# Patient Record
Sex: MALE | Race: BLACK OR AFRICAN AMERICAN | NOT HISPANIC OR LATINO | Employment: FULL TIME | URBAN - METROPOLITAN AREA
[De-identification: names, ages, dates, MRNs, and addresses within clinical notes are randomized per-mention and may not be internally consistent; named-entity substitution may affect disease eponyms.]

---

## 2021-01-01 ENCOUNTER — HOSPITAL ENCOUNTER (INPATIENT)
Facility: HOSPITAL | Age: 41
LOS: 10 days | Discharge: HOME | DRG: 885 | End: 2021-01-11
Attending: PSYCHIATRY & NEUROLOGY | Admitting: PSYCHIATRY & NEUROLOGY

## 2021-01-01 DIAGNOSIS — R44.0 AUDITORY HALLUCINATIONS: ICD-10-CM

## 2021-01-01 DIAGNOSIS — F20.89 OTHER SCHIZOPHRENIA: Primary | ICD-10-CM

## 2021-01-01 LAB
CHOLEST SERPL-MCNC: 117 MG/DL (ref 120–199)
CHOLEST/HDLC SERPL: 2.7 {RATIO} (ref 2–5)
ESTIMATED AVG GLUCOSE: 105 MG/DL (ref 68–131)
HBA1C MFR BLD HPLC: 5.3 % (ref 4–5.6)
HDLC SERPL-MCNC: 44 MG/DL (ref 40–75)
HDLC SERPL: 37.6 % (ref 20–50)
HIV1+2 IGG SERPL QL IA.RAPID: NORMAL
LDLC SERPL CALC-MCNC: 60.4 MG/DL (ref 63–159)
NONHDLC SERPL-MCNC: 73 MG/DL
TRIGL SERPL-MCNC: 63 MG/DL (ref 30–150)

## 2021-01-01 PROCEDURE — 99222 1ST HOSP IP/OBS MODERATE 55: CPT | Mod: ,,, | Performed by: INTERNAL MEDICINE

## 2021-01-01 PROCEDURE — 36415 COLL VENOUS BLD VENIPUNCTURE: CPT

## 2021-01-01 PROCEDURE — 99223 1ST HOSP IP/OBS HIGH 75: CPT | Mod: ,,, | Performed by: PSYCHIATRY & NEUROLOGY

## 2021-01-01 PROCEDURE — 80061 LIPID PANEL: CPT

## 2021-01-01 PROCEDURE — 90833 PSYTX W PT W E/M 30 MIN: CPT | Mod: ,,, | Performed by: PSYCHIATRY & NEUROLOGY

## 2021-01-01 PROCEDURE — 90833 PR PSYCHOTHERAPY W/PATIENT W/E&M, 30 MIN (ADD ON): ICD-10-PCS | Mod: ,,, | Performed by: PSYCHIATRY & NEUROLOGY

## 2021-01-01 PROCEDURE — 99223 PR INITIAL HOSPITAL CARE,LEVL III: ICD-10-PCS | Mod: ,,, | Performed by: PSYCHIATRY & NEUROLOGY

## 2021-01-01 PROCEDURE — 25000003 PHARM REV CODE 250: Performed by: PSYCHIATRY & NEUROLOGY

## 2021-01-01 PROCEDURE — 86701 HIV-1ANTIBODY: CPT

## 2021-01-01 PROCEDURE — 99222 PR INITIAL HOSPITAL CARE,LEVL II: ICD-10-PCS | Mod: ,,, | Performed by: INTERNAL MEDICINE

## 2021-01-01 PROCEDURE — 11400000 HC PSYCH PRIVATE ROOM

## 2021-01-01 PROCEDURE — 83036 HEMOGLOBIN GLYCOSYLATED A1C: CPT

## 2021-01-01 RX ORDER — FLUOXETINE 10 MG/1
10 CAPSULE ORAL DAILY
Status: ON HOLD | COMMUNITY
End: 2021-01-01

## 2021-01-01 RX ORDER — MAG HYDROX/ALUMINUM HYD/SIMETH 200-200-20
30 SUSPENSION, ORAL (FINAL DOSE FORM) ORAL EVERY 6 HOURS PRN
Status: DISCONTINUED | OUTPATIENT
Start: 2021-01-01 | End: 2021-01-11 | Stop reason: HOSPADM

## 2021-01-01 RX ORDER — FOLIC ACID 1 MG/1
1 TABLET ORAL DAILY
Status: DISCONTINUED | OUTPATIENT
Start: 2021-01-01 | End: 2021-01-11 | Stop reason: HOSPADM

## 2021-01-01 RX ORDER — OLANZAPINE 10 MG/2ML
10 INJECTION, POWDER, FOR SOLUTION INTRAMUSCULAR EVERY 6 HOURS PRN
Status: DISCONTINUED | OUTPATIENT
Start: 2021-01-01 | End: 2021-01-01

## 2021-01-01 RX ORDER — IBUPROFEN 200 MG
1 TABLET ORAL DAILY
Status: DISCONTINUED | OUTPATIENT
Start: 2021-01-01 | End: 2021-01-11 | Stop reason: HOSPADM

## 2021-01-01 RX ORDER — LOPERAMIDE HYDROCHLORIDE 2 MG/1
2 CAPSULE ORAL
Status: DISCONTINUED | OUTPATIENT
Start: 2021-01-01 | End: 2021-01-11 | Stop reason: HOSPADM

## 2021-01-01 RX ORDER — ACETAMINOPHEN 325 MG/1
650 TABLET ORAL EVERY 6 HOURS PRN
Status: DISCONTINUED | OUTPATIENT
Start: 2021-01-01 | End: 2021-01-11 | Stop reason: HOSPADM

## 2021-01-01 RX ORDER — MUPIROCIN 20 MG/G
OINTMENT TOPICAL 2 TIMES DAILY
Status: DISCONTINUED | OUTPATIENT
Start: 2021-01-01 | End: 2021-01-01

## 2021-01-01 RX ORDER — OLANZAPINE 10 MG/1
10 TABLET ORAL EVERY 6 HOURS PRN
Status: DISCONTINUED | OUTPATIENT
Start: 2021-01-01 | End: 2021-01-01

## 2021-01-01 RX ORDER — HYDROXYZINE PAMOATE 50 MG/1
50 CAPSULE ORAL EVERY 6 HOURS PRN
Status: DISCONTINUED | OUTPATIENT
Start: 2021-01-01 | End: 2021-01-01

## 2021-01-01 RX ORDER — OLANZAPINE 10 MG/1
10 TABLET ORAL EVERY 4 HOURS PRN
Status: DISCONTINUED | OUTPATIENT
Start: 2021-01-01 | End: 2021-01-11 | Stop reason: HOSPADM

## 2021-01-01 RX ORDER — DOCUSATE SODIUM 100 MG/1
100 CAPSULE, LIQUID FILLED ORAL DAILY PRN
Status: DISCONTINUED | OUTPATIENT
Start: 2021-01-01 | End: 2021-01-11 | Stop reason: HOSPADM

## 2021-01-01 RX ORDER — RISPERIDONE 0.5 MG/1
1 TABLET ORAL 2 TIMES DAILY
Status: DISCONTINUED | OUTPATIENT
Start: 2021-01-01 | End: 2021-01-04

## 2021-01-01 RX ORDER — BENZTROPINE MESYLATE 1 MG/1
1 TABLET ORAL 2 TIMES DAILY
Status: ON HOLD | COMMUNITY
End: 2021-01-01

## 2021-01-01 RX ORDER — HYDROXYZINE PAMOATE 50 MG/1
50 CAPSULE ORAL EVERY 6 HOURS PRN
Status: DISCONTINUED | OUTPATIENT
Start: 2021-01-01 | End: 2021-01-11 | Stop reason: HOSPADM

## 2021-01-01 RX ORDER — RISPERIDONE 1 MG/1
1 TABLET ORAL 2 TIMES DAILY
Status: ON HOLD | COMMUNITY
End: 2021-01-11 | Stop reason: HOSPADM

## 2021-01-01 RX ORDER — OLANZAPINE 10 MG/2ML
10 INJECTION, POWDER, FOR SOLUTION INTRAMUSCULAR EVERY 4 HOURS PRN
Status: DISCONTINUED | OUTPATIENT
Start: 2021-01-01 | End: 2021-01-11 | Stop reason: HOSPADM

## 2021-01-01 RX ORDER — SERTRALINE HYDROCHLORIDE 25 MG/1
25 TABLET, FILM COATED ORAL DAILY
Status: DISCONTINUED | OUTPATIENT
Start: 2021-01-01 | End: 2021-01-02

## 2021-01-01 RX ADMIN — THERA TABS 1 TABLET: TAB at 09:01

## 2021-01-01 RX ADMIN — RISPERIDONE 1 MG: 0.5 TABLET ORAL at 08:01

## 2021-01-01 RX ADMIN — SERTRALINE HYDROCHLORIDE 25 MG: 25 TABLET ORAL at 12:01

## 2021-01-01 RX ADMIN — FOLIC ACID 1 MG: 1 TABLET ORAL at 09:01

## 2021-01-02 PROCEDURE — 90833 PR PSYCHOTHERAPY W/PATIENT W/E&M, 30 MIN (ADD ON): ICD-10-PCS | Mod: ,,, | Performed by: PSYCHIATRY & NEUROLOGY

## 2021-01-02 PROCEDURE — 25000003 PHARM REV CODE 250: Performed by: PSYCHIATRY & NEUROLOGY

## 2021-01-02 PROCEDURE — 99233 PR SUBSEQUENT HOSPITAL CARE,LEVL III: ICD-10-PCS | Mod: ,,, | Performed by: PSYCHIATRY & NEUROLOGY

## 2021-01-02 PROCEDURE — 11400000 HC PSYCH PRIVATE ROOM

## 2021-01-02 PROCEDURE — 90833 PSYTX W PT W E/M 30 MIN: CPT | Mod: ,,, | Performed by: PSYCHIATRY & NEUROLOGY

## 2021-01-02 PROCEDURE — 99233 SBSQ HOSP IP/OBS HIGH 50: CPT | Mod: ,,, | Performed by: PSYCHIATRY & NEUROLOGY

## 2021-01-02 RX ORDER — SERTRALINE HYDROCHLORIDE 50 MG/1
50 TABLET, FILM COATED ORAL DAILY
Status: DISCONTINUED | OUTPATIENT
Start: 2021-01-03 | End: 2021-01-04

## 2021-01-02 RX ADMIN — RISPERIDONE 1 MG: 0.5 TABLET ORAL at 08:01

## 2021-01-02 RX ADMIN — FOLIC ACID 1 MG: 1 TABLET ORAL at 08:01

## 2021-01-02 RX ADMIN — THERA TABS 1 TABLET: TAB at 08:01

## 2021-01-02 RX ADMIN — SERTRALINE HYDROCHLORIDE 25 MG: 25 TABLET ORAL at 08:01

## 2021-01-03 PROCEDURE — 99233 PR SUBSEQUENT HOSPITAL CARE,LEVL III: ICD-10-PCS | Mod: ,,, | Performed by: PSYCHIATRY & NEUROLOGY

## 2021-01-03 PROCEDURE — 99233 SBSQ HOSP IP/OBS HIGH 50: CPT | Mod: ,,, | Performed by: PSYCHIATRY & NEUROLOGY

## 2021-01-03 PROCEDURE — 25000003 PHARM REV CODE 250: Performed by: PSYCHIATRY & NEUROLOGY

## 2021-01-03 PROCEDURE — 11400000 HC PSYCH PRIVATE ROOM

## 2021-01-03 PROCEDURE — 90833 PSYTX W PT W E/M 30 MIN: CPT | Mod: ,,, | Performed by: PSYCHIATRY & NEUROLOGY

## 2021-01-03 PROCEDURE — 90833 PR PSYCHOTHERAPY W/PATIENT W/E&M, 30 MIN (ADD ON): ICD-10-PCS | Mod: ,,, | Performed by: PSYCHIATRY & NEUROLOGY

## 2021-01-03 RX ORDER — DIPHENHYDRAMINE HCL 50 MG
50 CAPSULE ORAL NIGHTLY
Status: DISCONTINUED | OUTPATIENT
Start: 2021-01-03 | End: 2021-01-04

## 2021-01-03 RX ADMIN — DIPHENHYDRAMINE HYDROCHLORIDE 50 MG: 50 CAPSULE ORAL at 08:01

## 2021-01-03 RX ADMIN — RISPERIDONE 1 MG: 0.5 TABLET ORAL at 08:01

## 2021-01-03 RX ADMIN — SERTRALINE HYDROCHLORIDE 50 MG: 50 TABLET ORAL at 08:01

## 2021-01-03 RX ADMIN — FOLIC ACID 1 MG: 1 TABLET ORAL at 08:01

## 2021-01-03 RX ADMIN — THERA TABS 1 TABLET: TAB at 08:01

## 2021-01-04 PROCEDURE — 90833 PSYTX W PT W E/M 30 MIN: CPT | Mod: ,,, | Performed by: PSYCHIATRY & NEUROLOGY

## 2021-01-04 PROCEDURE — 99233 SBSQ HOSP IP/OBS HIGH 50: CPT | Mod: ,,, | Performed by: PSYCHIATRY & NEUROLOGY

## 2021-01-04 PROCEDURE — 11400000 HC PSYCH PRIVATE ROOM

## 2021-01-04 PROCEDURE — 90833 PR PSYCHOTHERAPY W/PATIENT W/E&M, 30 MIN (ADD ON): ICD-10-PCS | Mod: ,,, | Performed by: PSYCHIATRY & NEUROLOGY

## 2021-01-04 PROCEDURE — 25000003 PHARM REV CODE 250: Performed by: PSYCHIATRY & NEUROLOGY

## 2021-01-04 PROCEDURE — 99233 PR SUBSEQUENT HOSPITAL CARE,LEVL III: ICD-10-PCS | Mod: ,,, | Performed by: PSYCHIATRY & NEUROLOGY

## 2021-01-04 RX ORDER — RISPERIDONE 2 MG/1
2 TABLET ORAL 2 TIMES DAILY
Status: DISCONTINUED | OUTPATIENT
Start: 2021-01-04 | End: 2021-01-08

## 2021-01-04 RX ORDER — DIPHENHYDRAMINE HCL 50 MG
100 CAPSULE ORAL NIGHTLY
Status: DISCONTINUED | OUTPATIENT
Start: 2021-01-04 | End: 2021-01-11 | Stop reason: HOSPADM

## 2021-01-04 RX ADMIN — DIPHENHYDRAMINE HYDROCHLORIDE 100 MG: 50 CAPSULE ORAL at 08:01

## 2021-01-04 RX ADMIN — RISPERIDONE 1 MG: 0.5 TABLET ORAL at 08:01

## 2021-01-04 RX ADMIN — RISPERIDONE 2 MG: 2 TABLET ORAL at 08:01

## 2021-01-04 RX ADMIN — FOLIC ACID 1 MG: 1 TABLET ORAL at 08:01

## 2021-01-04 RX ADMIN — SERTRALINE HYDROCHLORIDE 50 MG: 50 TABLET ORAL at 08:01

## 2021-01-04 RX ADMIN — THERA TABS 1 TABLET: TAB at 08:01

## 2021-01-05 PROCEDURE — 25000003 PHARM REV CODE 250: Performed by: PSYCHIATRY & NEUROLOGY

## 2021-01-05 PROCEDURE — 99233 PR SUBSEQUENT HOSPITAL CARE,LEVL III: ICD-10-PCS | Mod: ,,, | Performed by: STUDENT IN AN ORGANIZED HEALTH CARE EDUCATION/TRAINING PROGRAM

## 2021-01-05 PROCEDURE — 11400000 HC PSYCH PRIVATE ROOM

## 2021-01-05 PROCEDURE — 99233 SBSQ HOSP IP/OBS HIGH 50: CPT | Mod: ,,, | Performed by: STUDENT IN AN ORGANIZED HEALTH CARE EDUCATION/TRAINING PROGRAM

## 2021-01-05 RX ADMIN — FOLIC ACID 1 MG: 1 TABLET ORAL at 08:01

## 2021-01-05 RX ADMIN — SERTRALINE HYDROCHLORIDE 75 MG: 50 TABLET ORAL at 08:01

## 2021-01-05 RX ADMIN — RISPERIDONE 2 MG: 2 TABLET ORAL at 08:01

## 2021-01-05 RX ADMIN — THERA TABS 1 TABLET: TAB at 08:01

## 2021-01-05 RX ADMIN — DIPHENHYDRAMINE HYDROCHLORIDE 100 MG: 50 CAPSULE ORAL at 08:01

## 2021-01-06 PROCEDURE — 25000003 PHARM REV CODE 250: Performed by: PSYCHIATRY & NEUROLOGY

## 2021-01-06 PROCEDURE — 11400000 HC PSYCH PRIVATE ROOM

## 2021-01-06 PROCEDURE — 99233 PR SUBSEQUENT HOSPITAL CARE,LEVL III: ICD-10-PCS | Mod: ,,, | Performed by: STUDENT IN AN ORGANIZED HEALTH CARE EDUCATION/TRAINING PROGRAM

## 2021-01-06 PROCEDURE — 99233 SBSQ HOSP IP/OBS HIGH 50: CPT | Mod: ,,, | Performed by: STUDENT IN AN ORGANIZED HEALTH CARE EDUCATION/TRAINING PROGRAM

## 2021-01-06 RX ORDER — SERTRALINE HYDROCHLORIDE 100 MG/1
100 TABLET, FILM COATED ORAL DAILY
Status: DISCONTINUED | OUTPATIENT
Start: 2021-01-07 | End: 2021-01-11 | Stop reason: HOSPADM

## 2021-01-06 RX ADMIN — SERTRALINE HYDROCHLORIDE 75 MG: 50 TABLET ORAL at 08:01

## 2021-01-06 RX ADMIN — RISPERIDONE 2 MG: 2 TABLET ORAL at 08:01

## 2021-01-06 RX ADMIN — THERA TABS 1 TABLET: TAB at 08:01

## 2021-01-06 RX ADMIN — DIPHENHYDRAMINE HYDROCHLORIDE 100 MG: 50 CAPSULE ORAL at 08:01

## 2021-01-06 RX ADMIN — FOLIC ACID 1 MG: 1 TABLET ORAL at 08:01

## 2021-01-07 PROCEDURE — 99233 PR SUBSEQUENT HOSPITAL CARE,LEVL III: ICD-10-PCS | Mod: ,,, | Performed by: STUDENT IN AN ORGANIZED HEALTH CARE EDUCATION/TRAINING PROGRAM

## 2021-01-07 PROCEDURE — 11400000 HC PSYCH PRIVATE ROOM

## 2021-01-07 PROCEDURE — 25000003 PHARM REV CODE 250: Performed by: STUDENT IN AN ORGANIZED HEALTH CARE EDUCATION/TRAINING PROGRAM

## 2021-01-07 PROCEDURE — 99233 SBSQ HOSP IP/OBS HIGH 50: CPT | Mod: ,,, | Performed by: STUDENT IN AN ORGANIZED HEALTH CARE EDUCATION/TRAINING PROGRAM

## 2021-01-07 PROCEDURE — 25000003 PHARM REV CODE 250: Performed by: PSYCHIATRY & NEUROLOGY

## 2021-01-07 RX ADMIN — THERA TABS 1 TABLET: TAB at 08:01

## 2021-01-07 RX ADMIN — FOLIC ACID 1 MG: 1 TABLET ORAL at 08:01

## 2021-01-07 RX ADMIN — RISPERIDONE 2 MG: 2 TABLET ORAL at 08:01

## 2021-01-07 RX ADMIN — DIPHENHYDRAMINE HYDROCHLORIDE 100 MG: 50 CAPSULE ORAL at 08:01

## 2021-01-07 RX ADMIN — SERTRALINE HYDROCHLORIDE 100 MG: 100 TABLET ORAL at 08:01

## 2021-01-08 PROCEDURE — 25000003 PHARM REV CODE 250: Performed by: PSYCHIATRY & NEUROLOGY

## 2021-01-08 PROCEDURE — 99233 SBSQ HOSP IP/OBS HIGH 50: CPT | Mod: ,,, | Performed by: STUDENT IN AN ORGANIZED HEALTH CARE EDUCATION/TRAINING PROGRAM

## 2021-01-08 PROCEDURE — 11400000 HC PSYCH PRIVATE ROOM

## 2021-01-08 PROCEDURE — 25000003 PHARM REV CODE 250: Performed by: STUDENT IN AN ORGANIZED HEALTH CARE EDUCATION/TRAINING PROGRAM

## 2021-01-08 PROCEDURE — 99233 PR SUBSEQUENT HOSPITAL CARE,LEVL III: ICD-10-PCS | Mod: ,,, | Performed by: STUDENT IN AN ORGANIZED HEALTH CARE EDUCATION/TRAINING PROGRAM

## 2021-01-08 RX ORDER — RISPERIDONE 3 MG/1
3 TABLET ORAL 2 TIMES DAILY
Status: DISCONTINUED | OUTPATIENT
Start: 2021-01-08 | End: 2021-01-11 | Stop reason: HOSPADM

## 2021-01-08 RX ADMIN — RISPERIDONE 2 MG: 2 TABLET ORAL at 08:01

## 2021-01-08 RX ADMIN — SERTRALINE HYDROCHLORIDE 100 MG: 100 TABLET ORAL at 08:01

## 2021-01-08 RX ADMIN — RISPERIDONE 3 MG: 3 TABLET ORAL at 08:01

## 2021-01-08 RX ADMIN — THERA TABS 1 TABLET: TAB at 08:01

## 2021-01-08 RX ADMIN — FOLIC ACID 1 MG: 1 TABLET ORAL at 08:01

## 2021-01-08 RX ADMIN — DIPHENHYDRAMINE HYDROCHLORIDE 100 MG: 50 CAPSULE ORAL at 08:01

## 2021-01-09 PROCEDURE — 99233 SBSQ HOSP IP/OBS HIGH 50: CPT | Mod: ,,, | Performed by: STUDENT IN AN ORGANIZED HEALTH CARE EDUCATION/TRAINING PROGRAM

## 2021-01-09 PROCEDURE — 25000003 PHARM REV CODE 250: Performed by: PSYCHIATRY & NEUROLOGY

## 2021-01-09 PROCEDURE — 11400000 HC PSYCH PRIVATE ROOM

## 2021-01-09 PROCEDURE — 25000003 PHARM REV CODE 250: Performed by: STUDENT IN AN ORGANIZED HEALTH CARE EDUCATION/TRAINING PROGRAM

## 2021-01-09 PROCEDURE — 99233 PR SUBSEQUENT HOSPITAL CARE,LEVL III: ICD-10-PCS | Mod: ,,, | Performed by: STUDENT IN AN ORGANIZED HEALTH CARE EDUCATION/TRAINING PROGRAM

## 2021-01-09 RX ORDER — MIRTAZAPINE 15 MG/1
15 TABLET, ORALLY DISINTEGRATING ORAL NIGHTLY
Status: DISCONTINUED | OUTPATIENT
Start: 2021-01-09 | End: 2021-01-11 | Stop reason: HOSPADM

## 2021-01-09 RX ADMIN — RISPERIDONE 3 MG: 3 TABLET ORAL at 08:01

## 2021-01-09 RX ADMIN — FOLIC ACID 1 MG: 1 TABLET ORAL at 08:01

## 2021-01-09 RX ADMIN — THERA TABS 1 TABLET: TAB at 08:01

## 2021-01-09 RX ADMIN — SERTRALINE HYDROCHLORIDE 100 MG: 100 TABLET ORAL at 08:01

## 2021-01-09 RX ADMIN — MIRTAZAPINE 15 MG: 15 TABLET, ORALLY DISINTEGRATING ORAL at 08:01

## 2021-01-09 RX ADMIN — DIPHENHYDRAMINE HYDROCHLORIDE 100 MG: 50 CAPSULE ORAL at 08:01

## 2021-01-10 PROCEDURE — 11400000 HC PSYCH PRIVATE ROOM

## 2021-01-10 PROCEDURE — 90833 PSYTX W PT W E/M 30 MIN: CPT | Mod: ,,, | Performed by: STUDENT IN AN ORGANIZED HEALTH CARE EDUCATION/TRAINING PROGRAM

## 2021-01-10 PROCEDURE — 25000003 PHARM REV CODE 250: Performed by: STUDENT IN AN ORGANIZED HEALTH CARE EDUCATION/TRAINING PROGRAM

## 2021-01-10 PROCEDURE — 99233 PR SUBSEQUENT HOSPITAL CARE,LEVL III: ICD-10-PCS | Mod: ,,, | Performed by: STUDENT IN AN ORGANIZED HEALTH CARE EDUCATION/TRAINING PROGRAM

## 2021-01-10 PROCEDURE — 90833 PR PSYCHOTHERAPY W/PATIENT W/E&M, 30 MIN (ADD ON): ICD-10-PCS | Mod: ,,, | Performed by: STUDENT IN AN ORGANIZED HEALTH CARE EDUCATION/TRAINING PROGRAM

## 2021-01-10 PROCEDURE — 99233 SBSQ HOSP IP/OBS HIGH 50: CPT | Mod: ,,, | Performed by: STUDENT IN AN ORGANIZED HEALTH CARE EDUCATION/TRAINING PROGRAM

## 2021-01-10 PROCEDURE — 25000003 PHARM REV CODE 250: Performed by: PSYCHIATRY & NEUROLOGY

## 2021-01-10 RX ADMIN — FOLIC ACID 1 MG: 1 TABLET ORAL at 08:01

## 2021-01-10 RX ADMIN — RISPERIDONE 3 MG: 3 TABLET ORAL at 08:01

## 2021-01-10 RX ADMIN — DIPHENHYDRAMINE HYDROCHLORIDE 100 MG: 50 CAPSULE ORAL at 08:01

## 2021-01-10 RX ADMIN — SERTRALINE HYDROCHLORIDE 100 MG: 100 TABLET ORAL at 08:01

## 2021-01-10 RX ADMIN — THERA TABS 1 TABLET: TAB at 08:01

## 2021-01-10 RX ADMIN — MIRTAZAPINE 15 MG: 15 TABLET, ORALLY DISINTEGRATING ORAL at 08:01

## 2021-01-11 VITALS
DIASTOLIC BLOOD PRESSURE: 55 MMHG | BODY MASS INDEX: 19.92 KG/M2 | HEIGHT: 72 IN | WEIGHT: 147.06 LBS | HEART RATE: 77 BPM | SYSTOLIC BLOOD PRESSURE: 102 MMHG | TEMPERATURE: 99 F | OXYGEN SATURATION: 98 % | RESPIRATION RATE: 18 BRPM

## 2021-01-11 PROBLEM — R44.0 AUDITORY HALLUCINATIONS: Status: RESOLVED | Noted: 2021-01-01 | Resolved: 2021-01-11

## 2021-01-11 PROCEDURE — S4991 NICOTINE PATCH NONLEGEND: HCPCS | Performed by: PSYCHIATRY & NEUROLOGY

## 2021-01-11 PROCEDURE — 99239 HOSP IP/OBS DSCHRG MGMT >30: CPT | Mod: ,,, | Performed by: PSYCHIATRY & NEUROLOGY

## 2021-01-11 PROCEDURE — 25000003 PHARM REV CODE 250: Performed by: STUDENT IN AN ORGANIZED HEALTH CARE EDUCATION/TRAINING PROGRAM

## 2021-01-11 PROCEDURE — 90833 PSYTX W PT W E/M 30 MIN: CPT | Mod: ,,, | Performed by: PSYCHIATRY & NEUROLOGY

## 2021-01-11 PROCEDURE — 90833 PR PSYCHOTHERAPY W/PATIENT W/E&M, 30 MIN (ADD ON): ICD-10-PCS | Mod: ,,, | Performed by: PSYCHIATRY & NEUROLOGY

## 2021-01-11 PROCEDURE — 25000003 PHARM REV CODE 250: Performed by: PSYCHIATRY & NEUROLOGY

## 2021-01-11 PROCEDURE — 99239 PR HOSPITAL DISCHARGE DAY,>30 MIN: ICD-10-PCS | Mod: ,,, | Performed by: PSYCHIATRY & NEUROLOGY

## 2021-01-11 RX ORDER — MIRTAZAPINE 15 MG/1
15 TABLET, FILM COATED ORAL NIGHTLY
Qty: 30 TABLET | Refills: 1 | Status: SHIPPED | OUTPATIENT
Start: 2021-01-11 | End: 2022-04-05

## 2021-01-11 RX ORDER — IBUPROFEN 200 MG
1 TABLET ORAL DAILY
COMMUNITY
Start: 2021-01-11 | End: 2022-03-29

## 2021-01-11 RX ORDER — RISPERIDONE 3 MG/1
3 TABLET ORAL 2 TIMES DAILY
Qty: 60 TABLET | Refills: 1 | Status: SHIPPED | OUTPATIENT
Start: 2021-01-11 | End: 2022-04-05

## 2021-01-11 RX ORDER — DIPHENHYDRAMINE HCL 50 MG
100 CAPSULE ORAL NIGHTLY
Qty: 60 CAPSULE | Refills: 1 | Status: ON HOLD | OUTPATIENT
Start: 2021-01-11 | End: 2022-04-05

## 2021-01-11 RX ORDER — SERTRALINE HYDROCHLORIDE 100 MG/1
100 TABLET, FILM COATED ORAL DAILY
Qty: 30 TABLET | Refills: 1 | Status: SHIPPED | OUTPATIENT
Start: 2021-01-12 | End: 2022-04-05

## 2021-01-11 RX ADMIN — RISPERIDONE 3 MG: 3 TABLET ORAL at 08:01

## 2021-01-11 RX ADMIN — NICOTINE 1 PATCH: 14 PATCH, EXTENDED RELEASE TRANSDERMAL at 08:01

## 2021-01-11 RX ADMIN — FOLIC ACID 1 MG: 1 TABLET ORAL at 08:01

## 2021-01-11 RX ADMIN — THERA TABS 1 TABLET: TAB at 08:01

## 2021-01-11 RX ADMIN — SERTRALINE HYDROCHLORIDE 100 MG: 100 TABLET ORAL at 08:01

## 2021-08-19 ENCOUNTER — TELEPHONE (OUTPATIENT)
Dept: PSYCHIATRY | Facility: CLINIC | Age: 41
End: 2021-08-19

## 2021-08-27 ENCOUNTER — TELEPHONE (OUTPATIENT)
Dept: PAIN MEDICINE | Facility: CLINIC | Age: 41
End: 2021-08-27

## 2022-03-29 ENCOUNTER — HOSPITAL ENCOUNTER (EMERGENCY)
Facility: HOSPITAL | Age: 42
Discharge: PSYCHIATRIC OR SUBSTANCE USE FACILITY | End: 2022-03-29
Attending: STUDENT IN AN ORGANIZED HEALTH CARE EDUCATION/TRAINING PROGRAM

## 2022-03-29 ENCOUNTER — HOSPITAL ENCOUNTER (INPATIENT)
Facility: HOSPITAL | Age: 42
LOS: 7 days | Discharge: HOME | DRG: 885 | End: 2022-04-05
Attending: STUDENT IN AN ORGANIZED HEALTH CARE EDUCATION/TRAINING PROGRAM | Admitting: STUDENT IN AN ORGANIZED HEALTH CARE EDUCATION/TRAINING PROGRAM

## 2022-03-29 VITALS
TEMPERATURE: 97 F | OXYGEN SATURATION: 98 % | RESPIRATION RATE: 18 BRPM | HEART RATE: 79 BPM | WEIGHT: 171.63 LBS | DIASTOLIC BLOOD PRESSURE: 80 MMHG | BODY MASS INDEX: 23.28 KG/M2 | SYSTOLIC BLOOD PRESSURE: 120 MMHG

## 2022-03-29 DIAGNOSIS — R44.0 AUDITORY HALLUCINATION: Primary | ICD-10-CM

## 2022-03-29 DIAGNOSIS — F29 PSYCHOSIS: ICD-10-CM

## 2022-03-29 DIAGNOSIS — F20.89 OTHER SCHIZOPHRENIA: Primary | ICD-10-CM

## 2022-03-29 LAB
ALBUMIN SERPL BCP-MCNC: 4.1 G/DL (ref 3.5–5.2)
ALP SERPL-CCNC: 67 U/L (ref 55–135)
ALT SERPL W/O P-5'-P-CCNC: 64 U/L (ref 10–44)
AMPHET+METHAMPHET UR QL: NEGATIVE
ANION GAP SERPL CALC-SCNC: 11 MMOL/L (ref 8–16)
APAP SERPL-MCNC: <3 UG/ML (ref 10–20)
AST SERPL-CCNC: 109 U/L (ref 10–40)
BARBITURATES UR QL SCN>200 NG/ML: NEGATIVE
BASOPHILS # BLD AUTO: 0.07 K/UL (ref 0–0.2)
BASOPHILS NFR BLD: 0.9 % (ref 0–1.9)
BENZODIAZ UR QL SCN>200 NG/ML: NEGATIVE
BILIRUB SERPL-MCNC: 0.5 MG/DL (ref 0.1–1)
BILIRUB UR QL STRIP: NEGATIVE
BUN SERPL-MCNC: 14 MG/DL (ref 6–20)
BZE UR QL SCN: NEGATIVE
CALCIUM SERPL-MCNC: 9.9 MG/DL (ref 8.7–10.5)
CANNABINOIDS UR QL SCN: NEGATIVE
CHLORIDE SERPL-SCNC: 108 MMOL/L (ref 95–110)
CLARITY UR: CLEAR
CO2 SERPL-SCNC: 25 MMOL/L (ref 23–29)
COLOR UR: YELLOW
CREAT SERPL-MCNC: 1.1 MG/DL (ref 0.5–1.4)
CREAT UR-MCNC: 207.1 MG/DL (ref 23–375)
DIFFERENTIAL METHOD: ABNORMAL
EOSINOPHIL # BLD AUTO: 0.5 K/UL (ref 0–0.5)
EOSINOPHIL NFR BLD: 6.4 % (ref 0–8)
ERYTHROCYTE [DISTWIDTH] IN BLOOD BY AUTOMATED COUNT: 13.3 % (ref 11.5–14.5)
EST. GFR  (AFRICAN AMERICAN): >60 ML/MIN/1.73 M^2
EST. GFR  (NON AFRICAN AMERICAN): >60 ML/MIN/1.73 M^2
ETHANOL SERPL-MCNC: 71 MG/DL
GLUCOSE SERPL-MCNC: 114 MG/DL (ref 70–110)
GLUCOSE UR QL STRIP: NEGATIVE
HCT VFR BLD AUTO: 42.7 % (ref 40–54)
HGB BLD-MCNC: 14.2 G/DL (ref 14–18)
HGB UR QL STRIP: NEGATIVE
IMM GRANULOCYTES # BLD AUTO: 0.02 K/UL (ref 0–0.04)
IMM GRANULOCYTES NFR BLD AUTO: 0.3 % (ref 0–0.5)
KETONES UR QL STRIP: NEGATIVE
LEUKOCYTE ESTERASE UR QL STRIP: NEGATIVE
LYMPHOCYTES # BLD AUTO: 2.6 K/UL (ref 1–4.8)
LYMPHOCYTES NFR BLD: 33 % (ref 18–48)
MCH RBC QN AUTO: 31.9 PG (ref 27–31)
MCHC RBC AUTO-ENTMCNC: 33.3 G/DL (ref 32–36)
MCV RBC AUTO: 96 FL (ref 82–98)
METHADONE UR QL SCN>300 NG/ML: NEGATIVE
MONOCYTES # BLD AUTO: 0.5 K/UL (ref 0.3–1)
MONOCYTES NFR BLD: 6.7 % (ref 4–15)
NEUTROPHILS # BLD AUTO: 4.2 K/UL (ref 1.8–7.7)
NEUTROPHILS NFR BLD: 52.7 % (ref 38–73)
NITRITE UR QL STRIP: NEGATIVE
NRBC BLD-RTO: 0 /100 WBC
OPIATES UR QL SCN: NEGATIVE
PCP UR QL SCN>25 NG/ML: NEGATIVE
PH UR STRIP: 7 [PH] (ref 5–8)
PLATELET # BLD AUTO: 309 K/UL (ref 150–450)
PMV BLD AUTO: 9.9 FL (ref 9.2–12.9)
POTASSIUM SERPL-SCNC: 3.5 MMOL/L (ref 3.5–5.1)
PROT SERPL-MCNC: 7.6 G/DL (ref 6–8.4)
PROT UR QL STRIP: NEGATIVE
RBC # BLD AUTO: 4.45 M/UL (ref 4.6–6.2)
SARS-COV-2 RDRP RESP QL NAA+PROBE: NEGATIVE
SODIUM SERPL-SCNC: 144 MMOL/L (ref 136–145)
SP GR UR STRIP: 1.02 (ref 1–1.03)
TOXICOLOGY INFORMATION: NORMAL
TSH SERPL DL<=0.005 MIU/L-ACNC: 1.18 UIU/ML (ref 0.4–4)
URN SPEC COLLECT METH UR: NORMAL
UROBILINOGEN UR STRIP-ACNC: 1 EU/DL
WBC # BLD AUTO: 7.96 K/UL (ref 3.9–12.7)

## 2022-03-29 PROCEDURE — 11400000 HC PSYCH PRIVATE ROOM

## 2022-03-29 PROCEDURE — 25000003 PHARM REV CODE 250: Performed by: STUDENT IN AN ORGANIZED HEALTH CARE EDUCATION/TRAINING PROGRAM

## 2022-03-29 PROCEDURE — 84443 ASSAY THYROID STIM HORMONE: CPT | Performed by: STUDENT IN AN ORGANIZED HEALTH CARE EDUCATION/TRAINING PROGRAM

## 2022-03-29 PROCEDURE — 80061 LIPID PANEL: CPT | Performed by: STUDENT IN AN ORGANIZED HEALTH CARE EDUCATION/TRAINING PROGRAM

## 2022-03-29 PROCEDURE — 85025 COMPLETE CBC W/AUTO DIFF WBC: CPT | Performed by: STUDENT IN AN ORGANIZED HEALTH CARE EDUCATION/TRAINING PROGRAM

## 2022-03-29 PROCEDURE — 82077 ASSAY SPEC XCP UR&BREATH IA: CPT | Performed by: STUDENT IN AN ORGANIZED HEALTH CARE EDUCATION/TRAINING PROGRAM

## 2022-03-29 PROCEDURE — 36415 COLL VENOUS BLD VENIPUNCTURE: CPT | Performed by: STUDENT IN AN ORGANIZED HEALTH CARE EDUCATION/TRAINING PROGRAM

## 2022-03-29 PROCEDURE — 83036 HEMOGLOBIN GLYCOSYLATED A1C: CPT | Performed by: STUDENT IN AN ORGANIZED HEALTH CARE EDUCATION/TRAINING PROGRAM

## 2022-03-29 PROCEDURE — 80053 COMPREHEN METABOLIC PANEL: CPT | Performed by: STUDENT IN AN ORGANIZED HEALTH CARE EDUCATION/TRAINING PROGRAM

## 2022-03-29 PROCEDURE — 80143 DRUG ASSAY ACETAMINOPHEN: CPT | Performed by: STUDENT IN AN ORGANIZED HEALTH CARE EDUCATION/TRAINING PROGRAM

## 2022-03-29 PROCEDURE — 80307 DRUG TEST PRSMV CHEM ANLYZR: CPT | Performed by: STUDENT IN AN ORGANIZED HEALTH CARE EDUCATION/TRAINING PROGRAM

## 2022-03-29 PROCEDURE — 81003 URINALYSIS AUTO W/O SCOPE: CPT | Mod: 59 | Performed by: STUDENT IN AN ORGANIZED HEALTH CARE EDUCATION/TRAINING PROGRAM

## 2022-03-29 PROCEDURE — U0002 COVID-19 LAB TEST NON-CDC: HCPCS | Performed by: STUDENT IN AN ORGANIZED HEALTH CARE EDUCATION/TRAINING PROGRAM

## 2022-03-29 PROCEDURE — 99285 EMERGENCY DEPT VISIT HI MDM: CPT

## 2022-03-29 RX ORDER — HYDROXYZINE HYDROCHLORIDE 25 MG/1
50 TABLET, FILM COATED ORAL NIGHTLY PRN
Status: DISCONTINUED | OUTPATIENT
Start: 2022-03-29 | End: 2022-03-30

## 2022-03-29 RX ORDER — OLANZAPINE 10 MG/2ML
10 INJECTION, POWDER, FOR SOLUTION INTRAMUSCULAR EVERY 4 HOURS PRN
Status: DISCONTINUED | OUTPATIENT
Start: 2022-03-29 | End: 2022-04-05 | Stop reason: HOSPADM

## 2022-03-29 RX ORDER — IBUPROFEN 400 MG/1
400 TABLET ORAL EVERY 6 HOURS PRN
Status: DISCONTINUED | OUTPATIENT
Start: 2022-03-29 | End: 2022-04-05 | Stop reason: HOSPADM

## 2022-03-29 RX ORDER — ADHESIVE BANDAGE
30 BANDAGE TOPICAL DAILY PRN
Status: DISCONTINUED | OUTPATIENT
Start: 2022-03-29 | End: 2022-04-05 | Stop reason: HOSPADM

## 2022-03-29 RX ORDER — THIAMINE HCL 100 MG
100 TABLET ORAL DAILY
Status: DISCONTINUED | OUTPATIENT
Start: 2022-03-30 | End: 2022-04-05 | Stop reason: HOSPADM

## 2022-03-29 RX ORDER — FOLIC ACID 1 MG/1
1 TABLET ORAL DAILY
Status: DISCONTINUED | OUTPATIENT
Start: 2022-03-30 | End: 2022-04-05 | Stop reason: HOSPADM

## 2022-03-29 RX ORDER — IBUPROFEN 200 MG
1 TABLET ORAL DAILY PRN
Status: DISCONTINUED | OUTPATIENT
Start: 2022-03-29 | End: 2022-03-29

## 2022-03-29 RX ORDER — OLANZAPINE 10 MG/1
10 TABLET ORAL EVERY 4 HOURS PRN
Status: DISCONTINUED | OUTPATIENT
Start: 2022-03-29 | End: 2022-04-05 | Stop reason: HOSPADM

## 2022-03-29 RX ORDER — ACETAMINOPHEN 325 MG/1
650 TABLET ORAL EVERY 6 HOURS PRN
Status: DISCONTINUED | OUTPATIENT
Start: 2022-03-29 | End: 2022-04-05 | Stop reason: HOSPADM

## 2022-03-29 RX ORDER — MAG HYDROX/ALUMINUM HYD/SIMETH 200-200-20
30 SUSPENSION, ORAL (FINAL DOSE FORM) ORAL
Status: DISCONTINUED | OUTPATIENT
Start: 2022-03-29 | End: 2022-04-05 | Stop reason: HOSPADM

## 2022-03-29 RX ORDER — IBUPROFEN 200 MG
1 TABLET ORAL DAILY
Status: DISCONTINUED | OUTPATIENT
Start: 2022-03-30 | End: 2022-04-05 | Stop reason: HOSPADM

## 2022-03-29 RX ORDER — ESOMEPRAZOLE MAGNESIUM 40 MG/1
40 CAPSULE, DELAYED RELEASE ORAL
COMMUNITY
End: 2022-04-05

## 2022-03-29 RX ADMIN — OLANZAPINE 10 MG: 10 TABLET, FILM COATED ORAL at 10:03

## 2022-03-29 RX ADMIN — HYDROXYZINE HYDROCHLORIDE 50 MG: 25 TABLET, FILM COATED ORAL at 08:03

## 2022-03-29 NOTE — ED TRIAGE NOTES
"Patient to ED with c/o hearing voices. Patient reports "my mind is restless." Patient denies HI or SI.  "

## 2022-03-29 NOTE — ED PROVIDER NOTES
"Encounter Date: 3/29/2022       History     Chief Complaint   Patient presents with    Psychiatric Evaluation     41-year-old male with history of anxiety presenting with 2-3 days of altered mood and auditory hallucinations.  He is here with his brother he states that patient was hanging out with a female friend of his, and he notice that when he returned from work (his brother works as a , and is ofte gone for weeks at a time) he notice that his brother was acting differently.  Patient denies any suicidal or homicidal ideations.  States that he feels as though he "can't live in the well today".  Denies visual hallucinations.        Review of patient's allergies indicates:  No Known Allergies  Past Medical History:   Diagnosis Date    Anxiety     Depression      History reviewed. No pertinent surgical history.  History reviewed. No pertinent family history.  Social History     Tobacco Use    Smoking status: Current Every Day Smoker     Packs/day: 1.00     Years: 21.00     Pack years: 21.00     Types: Cigarettes    Smokeless tobacco: Never Used   Substance Use Topics    Alcohol use: Yes    Drug use: Never     Review of Systems   Constitutional: Negative for fever.   HENT: Negative for sore throat.    Respiratory: Negative for shortness of breath.    Cardiovascular: Negative for chest pain.   Gastrointestinal: Negative for nausea.   Genitourinary: Negative for dysuria.   Musculoskeletal: Negative for back pain.   Skin: Negative for rash.   Neurological: Negative for weakness.   Hematological: Does not bruise/bleed easily.   Psychiatric/Behavioral: Positive for hallucinations.       Physical Exam     Initial Vitals [03/29/22 1319]   BP Pulse Resp Temp SpO2   (!) 144/92 88 20 97.8 °F (36.6 °C) 100 %      MAP       --         Physical Exam    Nursing note and vitals reviewed.  Constitutional: He appears well-developed.   Eyes: EOM are normal.   Neck:   Normal range of motion.  Cardiovascular:   No murmur " heard.  Pulmonary/Chest: No respiratory distress.   Abdominal: He exhibits no distension.   Musculoskeletal:         General: Normal range of motion.      Cervical back: Normal range of motion.     Neurological: He is alert.   Skin: Skin is warm.   Psychiatric:   Positive auditory hallucinations.  No visual hallucinations.  No suicidal or homicidal ideations.         ED Course   Procedures  Labs Reviewed   CBC W/ AUTO DIFFERENTIAL - Abnormal; Notable for the following components:       Result Value    RBC 4.45 (*)     MCH 31.9 (*)     All other components within normal limits   COMPREHENSIVE METABOLIC PANEL - Abnormal; Notable for the following components:    Glucose 114 (*)      (*)     ALT 64 (*)     All other components within normal limits   ALCOHOL,MEDICAL (ETHANOL) - Abnormal; Notable for the following components:    Alcohol, Serum 71 (*)     All other components within normal limits   ACETAMINOPHEN LEVEL - Abnormal; Notable for the following components:    Acetaminophen (Tylenol), Serum <3.0 (*)     All other components within normal limits   TSH   SARS-COV-2 RNA AMPLIFICATION, QUAL   URINALYSIS, REFLEX TO URINE CULTURE   DRUG SCREEN PANEL, URINE EMERGENCY          Imaging Results    None          Medications - No data to display  Medical Decision Making:   Differential Diagnosis:   DDX:  Auditory hallucinations.  Low suspicion for suicidal or homicidal ideation.  DX:  Psych labs  TX:  None  Dispo:  Admission to psych floor.                      Clinical Impression:   Final diagnoses:  [R44.0] Auditory hallucination (Primary)          ED Disposition Condition    Transfer to Psych Facility         ED Prescriptions     None        Follow-up Information    None          Efrain Narvaez MD  03/29/22 2985       Efrain Narvaez MD  03/29/22 [id number]

## 2022-03-30 PROBLEM — R79.89 ELEVATED LFTS: Status: ACTIVE | Noted: 2022-03-30

## 2022-03-30 LAB
CHOLEST SERPL-MCNC: 141 MG/DL (ref 120–199)
CHOLEST/HDLC SERPL: 3.4 {RATIO} (ref 2–5)
ESTIMATED AVG GLUCOSE: 108 MG/DL (ref 68–131)
HBA1C MFR BLD: 5.4 % (ref 4–5.6)
HDLC SERPL-MCNC: 41 MG/DL (ref 40–75)
HDLC SERPL: 29.1 % (ref 20–50)
LDLC SERPL CALC-MCNC: 71.4 MG/DL (ref 63–159)
NONHDLC SERPL-MCNC: 100 MG/DL
TRIGL SERPL-MCNC: 143 MG/DL (ref 30–150)

## 2022-03-30 PROCEDURE — 99252 IP/OBS CONSLTJ NEW/EST SF 35: CPT | Mod: ,,, | Performed by: NURSE PRACTITIONER

## 2022-03-30 PROCEDURE — 90833 PR PSYCHOTHERAPY W/PATIENT W/E&M, 30 MIN (ADD ON): ICD-10-PCS | Mod: ,,, | Performed by: STUDENT IN AN ORGANIZED HEALTH CARE EDUCATION/TRAINING PROGRAM

## 2022-03-30 PROCEDURE — 90833 PSYTX W PT W E/M 30 MIN: CPT | Mod: ,,, | Performed by: STUDENT IN AN ORGANIZED HEALTH CARE EDUCATION/TRAINING PROGRAM

## 2022-03-30 PROCEDURE — 25000003 PHARM REV CODE 250: Performed by: STUDENT IN AN ORGANIZED HEALTH CARE EDUCATION/TRAINING PROGRAM

## 2022-03-30 PROCEDURE — 36415 COLL VENOUS BLD VENIPUNCTURE: CPT | Performed by: STUDENT IN AN ORGANIZED HEALTH CARE EDUCATION/TRAINING PROGRAM

## 2022-03-30 PROCEDURE — 11400000 HC PSYCH PRIVATE ROOM

## 2022-03-30 PROCEDURE — 99252 PR INITIAL INPATIENT CONSULT,LEVL II: ICD-10-PCS | Mod: ,,, | Performed by: NURSE PRACTITIONER

## 2022-03-30 PROCEDURE — 99223 PR INITIAL HOSPITAL CARE,LEVL III: ICD-10-PCS | Mod: ,,, | Performed by: STUDENT IN AN ORGANIZED HEALTH CARE EDUCATION/TRAINING PROGRAM

## 2022-03-30 PROCEDURE — 99223 1ST HOSP IP/OBS HIGH 75: CPT | Mod: ,,, | Performed by: STUDENT IN AN ORGANIZED HEALTH CARE EDUCATION/TRAINING PROGRAM

## 2022-03-30 RX ORDER — HYDROXYZINE HYDROCHLORIDE 25 MG/1
50 TABLET, FILM COATED ORAL EVERY 6 HOURS PRN
Status: DISCONTINUED | OUTPATIENT
Start: 2022-03-30 | End: 2022-04-05 | Stop reason: HOSPADM

## 2022-03-30 RX ORDER — OLANZAPINE 5 MG/1
5 TABLET ORAL NIGHTLY
Status: DISCONTINUED | OUTPATIENT
Start: 2022-03-30 | End: 2022-03-31

## 2022-03-30 RX ADMIN — Medication 100 MG: at 09:03

## 2022-03-30 RX ADMIN — THERA TABS 1 TABLET: TAB at 08:03

## 2022-03-30 RX ADMIN — HYDROXYZINE HYDROCHLORIDE 50 MG: 25 TABLET, FILM COATED ORAL at 08:03

## 2022-03-30 RX ADMIN — FOLIC ACID 1 MG: 1 TABLET ORAL at 08:03

## 2022-03-30 RX ADMIN — OLANZAPINE 5 MG: 5 TABLET, FILM COATED ORAL at 08:03

## 2022-03-30 NOTE — NURSING
Pt sleeping at this time, slept 6 hrs with 2 awakenings. NAD. Resp even and unlabored.Pathways clear,bed in low position. Q 15 min safety check ongoing.All precautions maintained.

## 2022-03-30 NOTE — PROGRESS NOTES
"   03/30/22 1611   [facility] Group Therapy   Group Name Other  (1:1)   Specific Interventions Leisure Counseling  (educated on the importance of participating in healthy leisure activities)   Participation Level Sharing   Participation Quality Requires Prompting  (questions repeated as needed)   Insight/Motivation Improved   Affect/Mood Display Anxious;Depressed   Cognition Alert   Psychomotor WNL   Patient resting in bed, presents depressed, anxious, poor eye contact, states he is uncomfortable, around people, thinks people are talking about him, is hearing voices telling him "it's over, everything over." Patient states "that's not normal, hearing voices." Patient encouraged to attend group and educated on the importance of positive leisure involvement.  "

## 2022-03-30 NOTE — NURSING
Pt restless in bed and unable to sleep. Appears agitated. PRN Zyprexa administered PO to aid with agitation/restlessness.

## 2022-03-30 NOTE — PLAN OF CARE
Patient guarded, quiet, and withdrawn; unkempt.  Denies intentions to harm self and/or others at this time. Denies auditory and/or visual hallucinations.  Affect and emotion flat and depressed.  Paucity of speech with impoverished thoughts.  Isolates in room; minimal interactions with peers and staff.  Accepts meals and medications.  Discussed medication and plan of care; patient needs continued education and reinforcement.

## 2022-03-30 NOTE — NURSING
"Admit assessment completed.  Pt rights and expectations reviewed and handouts provided.  Pt signed all paperwork.  Pt unkept, blunted, depressed.  Poor eye contact.  Cooperative.  States reason for admit as "depression and stressed out."  Currently denies SI/HI.  Verbal contract for safety.  Admits to auditory and visual hallucinations.  Rates depression and anxiety "7-8/10."  Poor sleep, appetite fair.  Oriented to unit.  Food (warm tray) and fluids (apple and grape juice) provided.  No complaints voiced.  Vistaril po given for complaint of insomnia.  No distress noted.  Will monitor for safety.  "

## 2022-03-30 NOTE — CONSULTS
[facility] Hospital Medicine  Consult Note    Patient Name: Jean Cruz  MRN: [id number]  Admission Date: 3/29/2022  Hospital Length of Stay: 1 days  Attending Physician: Zaid Matson III, MD   Primary Care Provider: Primary Doctor No           Patient information was obtained from patient and ER records.     Inpatient consult to [facility] for History and Physical  Consult performed by: Dorothy Leslie NP  Consult ordered by: Zaid Matson III, MD        Subjective:     Principal Problem: Other schizophrenia    Chief Complaint: No chief complaint on file.       HPI: 40yo male patient with no medical history presented to ER with hallucinations. Admitted to [facility] and medicine was asked to do H/P. VSS/afebrile, labs reviewed, elevated L:FT and ETOH level      Past Medical History:   Diagnosis Date    Anxiety     Depression     History of psychiatric hospitalization     Hx of psychiatric care     Psychiatric problem     Therapy        No past surgical history on file.    Review of patient's allergies indicates:  No Known Allergies    No current facility-administered medications on file prior to encounter.     Current Outpatient Medications on File Prior to Encounter   Medication Sig    diphenhydrAMINE (BENADRYL) 50 MG capsule Take 2 capsules (100 mg total) by mouth every evening.    esomeprazole (NEXIUM) 40 MG capsule Take 40 mg by mouth before breakfast.    mirtazapine (REMERON) 15 MG tablet Take 1 tablet (15 mg total) by mouth every evening.    risperiDONE (RISPERDAL) 3 MG Tab Take 1 tablet (3 mg total) by mouth 2 (two) times daily.    sertraline (ZOLOFT) 100 MG tablet Take 1 tablet (100 mg total) by mouth once daily.     Family History    None       Tobacco Use    Smoking status: Current Every Day Smoker     Packs/day: 1.00     Years: 21.00     Pack years: 21.00     Types: Cigarettes    Smokeless tobacco: Never Used   Substance and Sexual Activity    Alcohol use: Yes     Drug use: Never    Sexual activity: Not on file     Review of Systems   Constitutional:  Negative for chills and fever.   Respiratory:  Negative for chest tightness and shortness of breath.    Cardiovascular:  Negative for chest pain and palpitations.   Gastrointestinal:  Negative for abdominal distention, abdominal pain, blood in stool and vomiting.   Genitourinary:  Negative for dysuria, flank pain, hematuria and urgency.   Musculoskeletal:  Negative for back pain and neck pain.   Skin:  Negative for rash and wound.   Neurological:  Negative for dizziness, weakness and numbness.   Hematological:  Does not bruise/bleed easily.   Psychiatric/Behavioral:  Positive for self-injury. Negative for agitation and suicidal ideas. The patient is not nervous/anxious.    Objective:     Vital Signs (Most Recent):  Temp: 97.5 °F (36.4 °C) (03/30/22 0719)  Pulse: 90 (03/30/22 0719)  Resp: 18 (03/30/22 0719)  BP: 124/82 (03/30/22 0719)  SpO2: 98 % (03/29/22 1940) Vital Signs (24h Range):  Temp:  [97.3 °F (36.3 °C)-97.8 °F (36.6 °C)] 97.5 °F (36.4 °C)  Pulse:  [79-90] 90  Resp:  [18-20] 18  SpO2:  [98 %-100 %] 98 %  BP: (115-170)/(66-92) 124/82     Weight: 78.6 kg (173 lb 4.5 oz)  Body mass index is 23.5 kg/m².    Physical Exam  Vitals and nursing note reviewed.   Constitutional:       Appearance: He is well-developed.   HENT:      Head: Normocephalic and atraumatic.   Neck:      Thyroid: No thyromegaly.   Cardiovascular:      Rate and Rhythm: Normal rate and regular rhythm.      Heart sounds: Normal heart sounds. No murmur heard.  Pulmonary:      Effort: Pulmonary effort is normal. No respiratory distress.      Breath sounds: Normal breath sounds. No wheezing or rales.   Abdominal:      General: Bowel sounds are normal. There is no distension.      Palpations: Abdomen is soft.      Tenderness: There is no abdominal tenderness.   Musculoskeletal:         General: No deformity. Normal range of motion.      Cervical back: Normal  range of motion and neck supple.   Skin:     General: Skin is warm and dry.   Neurological:      Mental Status: He is alert and oriented to person, place, and time.      Comments: Neuro: Cranial nerves:  CN II Visual fields full to confrontation.   CN III, IV, VI Pupils are equal, round, and reactive to light.  CN III: no palsy  Nystagmus: none   Diplopia: none  Ophthalmoparesis: none  CN V Facial sensation intact.   CN VII Facial expression full, symmetric.   CN VIII normal.   CN IX normal.   CN X normal.   CN XI normal.   CN XII normal.     Psychiatric:         Behavior: Behavior normal.         Thought Content: Thought content normal.       Significant Labs:   U/A negative     UDS  Results for orders placed or performed during the hospital encounter of 03/29/22   Drug screen panel, emergency   Result Value Ref Range    Benzodiazepines Negative Negative    Methadone metabolites Negative Negative    Cocaine (Metab.) Negative Negative    Opiate Scrn, Ur Negative Negative    Barbiturate Screen, Ur Negative Negative    Amphetamine Screen, Ur Negative Negative    THC Negative Negative    Phencyclidine Negative Negative    Creatinine, Urine 207.1 23.0 - 375.0 mg/dL    Toxicology Information SEE COMMENT      CBC  Results for orders placed or performed during the hospital encounter of 03/29/22   CBC auto differential   Result Value Ref Range    WBC 7.96 3.90 - 12.70 K/uL    RBC 4.45 (L) 4.60 - 6.20 M/uL    Hemoglobin 14.2 14.0 - 18.0 g/dL    Hematocrit 42.7 40.0 - 54.0 %    MCV 96 82 - 98 fL    MCH 31.9 (H) 27.0 - 31.0 pg    MCHC 33.3 32.0 - 36.0 g/dL    RDW 13.3 11.5 - 14.5 %    Platelets 309 150 - 450 K/uL    MPV 9.9 9.2 - 12.9 fL    Immature Granulocytes 0.3 0.0 - 0.5 %    Gran # (ANC) 4.2 1.8 - 7.7 K/uL    Immature Grans (Abs) 0.02 0.00 - 0.04 K/uL    Lymph # 2.6 1.0 - 4.8 K/uL    Mono # 0.5 0.3 - 1.0 K/uL    Eos # 0.5 0.0 - 0.5 K/uL    Baso # 0.07 0.00 - 0.20 K/uL    nRBC 0 0 /100 WBC    Gran % 52.7 38.0 - 73.0 %     Lymph % 33.0 18.0 - 48.0 %    Mono % 6.7 4.0 - 15.0 %    Eosinophil % 6.4 0.0 - 8.0 %    Basophil % 0.9 0.0 - 1.9 %    Differential Method Automated      CMP  Results for orders placed or performed during the hospital encounter of 03/29/22   Comprehensive metabolic panel   Result Value Ref Range    Sodium 144 136 - 145 mmol/L    Potassium 3.5 3.5 - 5.1 mmol/L    Chloride 108 95 - 110 mmol/L    CO2 25 23 - 29 mmol/L    Glucose 114 (H) 70 - 110 mg/dL    BUN 14 6 - 20 mg/dL    Creatinine 1.1 0.5 - 1.4 mg/dL    Calcium 9.9 8.7 - 10.5 mg/dL    Total Protein 7.6 6.0 - 8.4 g/dL    Albumin 4.1 3.5 - 5.2 g/dL    Total Bilirubin 0.5 0.1 - 1.0 mg/dL    Alkaline Phosphatase 67 55 - 135 U/L     (H) 10 - 40 U/L    ALT 64 (H) 10 - 44 U/L    Anion Gap 11 8 - 16 mmol/L    eGFR if African American >60 >60 mL/min/1.73 m^2    eGFR if non African American >60 >60 mL/min/1.73 m^2     Hep screen     TSH  Results for orders placed or performed during the hospital encounter of 03/29/22   TSH   Result Value Ref Range    TSH 1.180 0.400 - 4.000 uIU/mL     ETOH  Results for orders placed or performed during the hospital encounter of 03/29/22   Ethanol   Result Value Ref Range    Alcohol, Serum 71 (H) <10 mg/dL     Lab Results   Component Value Date    HGBA1C 5.4 03/29/2022       Acetaminophen  Results for orders placed or performed during the hospital encounter of 03/29/22   Acetaminophen level   Result Value Ref Range    Acetaminophen (Tylenol), Serum <3.0 (L) 10.0 - 20.0 ug/mL             Assessment/Plan:     * Other schizophrenia  Further orders per psych       Elevated LFTs  + ETOH  Will check hep screen and rep[eat CMP in am         VTE Risk Mitigation (From admission, onward)    None              Thank you for your consult. I will sign off. Please contact us if you have any additional questions.    Dorothy Leslie NP  Department of Hospital Medicine   [facility]

## 2022-03-30 NOTE — MEDICAL/APP STUDENT
"PSYCHIATRY INPATIENT ADMISSION NOTE - H & P      3/30/2022 7:49 AM   Jean Cruz   1980   [id number]         DATE OF ADMISSION: 3/29/2022  7:39 PM    SITE: [facility]    CURRENT LEGAL STATUS: PEC and/or CEC      HISTORY    CHIEF COMPLAINT   Jean Cruz is a 41 y.o. male with a past psychiatric history of Psychosis (Other schizophrenia), depressive sx's currently admitted to the inpatient unit with the following chief complaint: altered mood and auditory hallucinations.    HPI   Per the ED:    ***Addendum to ED note:Patnt works as a  not a .    "41-year-old male with history of anxiety presenting with 2-3 days of altered mood and auditory hallucinations.  He is here with his brother he states that patient was hanging out with a female friend of his, and he notice that when he returned from work (his brother works as a , and is ofte gone for weeks at a time) he notice that his brother was acting differently.  Patient denies any suicidal or homicidal ideations.  States that he feels as though he "can't live in the well today".  Denies visual hallucinations."     The patient was seen and examined. The chart was reviewed.    The patient presented to the ER on 3/29/2022 with complaints of altered mood and auditory hallucinations.     After asking the patient about what led to his current admission, the patient states: "A lot going on." Upon further questioning the details surrounding the patient's hospitalization, the patient appears to be in deep thought; however did not respond to questions in a coherent manner (mumbling to himself).    Patient states that his auditory hallucinations, depressed mood with anxiety are his main complaints. Upon inquiring further about AH, the patient was reluctant and declined to describe the voices and the content of what these voices were sayng. However, the patient admits to the voices directing him to do things. The patient reports as " feeling uncomfortable describing the content of the voices.    The patient was medically cleared and admitted to the [facility].          Symptoms of Depression: diminished mood - Yes, loss of interest/anhedonia - Yes;  recurrent - Yes, >14 days - Yes, diminished energy - No, change in sleep - Yes, change in appetite - No, diminished concentration or cognition or indecisiveness - Yes, PMA/R -  Yes, excessive guilt or hopelessness or worthlessness - Yes, suicidal ideations - No     Changes in Sleep: trouble with initiation- Yes, maintenance, - Yes early morning awakening with inability to return to sleep - Yes, hypersomnolence - No    Suicidal- active/passive ideations - No, organized plans, future intentions - No    Homicidal ideations: active/passive ideations - No, organized plans, future intentions - No    Symptoms of psychosis: hallucinations - Yes, delusions - No, disorganized speech - Yes, disorganized behavior or abnormal motor behavior - No, or negative symptoms (diminshed emotional expression, avolition, anhedonia, alogia, asociality) - Yes, active phase symptoms >1 month - Yes, continuous signs of illness > 6 months - Yes, since onset of illness decreased level of functioning present - Yes    Symptoms of janna or hypomania: elevated, expansive, or irritable mood with increased energy or activity - No; > 4 days - No,  >7 days - No; with inflated self-esteem or grandiosity - No, decreased need for sleep - No, increased rate of speech - No, FOI or racing thoughts - Yes, distractibility - Yes, increased goal directed activity or PMA - No, risky/disinhibited behavior - No    Symptoms of DORIAN: excessive anxiety/worry/fear, more days than not, about numerous issues - Yes, ongoing for >6 months - Yes, difficult to control - Yes, with restlessness - Yes, fatigue - No, poor concentration - Yes, irritability - Yes, muscle tension - No, sleep disturbance - Yes; causes functionally impairing distress - Yes    Symptoms of Panic  "Disorder: recurrent panic attacks (palpitations/heart racing, sweating, shakiness, dyspnea, choking, chest pain/discomfort, Gi symptoms, dizzy/lightheadedness, hot/col flashes, paresthesias, derealization, fear of losing control or fear of dying or fear of "going crazy") - Yes, precipitated - No, un-precipitated - No, source of worry and/or behavioral changes secondary for 1 month or longer- Yes, agoraphobia - No    Symptoms of PTSD: h/o trauma exposure - No; re-experiencing/intrusive symptoms - No, avoidant behavior - No, 2 or more negative alterations in cognition or mood - No, 2 or more hyperarousal symptoms - No; with dissociative symptoms - No, ongoing for 1 or more  months - No    Symptoms of OCD: obsessions (recurrent thoughts/urges/images; intrusive and/or unwanted; uses other thoughts/actions to suppress) - Yes; compulsions (repetitive behaviors used to lower distress/anxiety/obsessions) - No, time-consuming (over 1 hour per day) or cause significant distress/impairment - - No    Symptoms of Anorexia: restriction of caloric intake leading to significantly low body weight - No, intense fear of gaining weight or persistent behavior that interferes with weight gain even thought at a significantly low weight - No, disturbance in the way in which one's body weight or shape is experienced, undue influence of body weight or shape on self evaluation, or persistent lack of recognition of the seriousness of the current low body weight - No    Symptoms of Bulimia: recurrent episodes of binge eating (definitely larger amount  than what others would eat and lack of a sense of control over eating during episode) - No, recurrent inappropriate compensatory behaviors in order to prevent weight gain (fasting, medications, exercise, vomiting) - No, binges and compensatory behaviors both occur on average at least once a week for 3 months - No, self evaluations is unduly influenced by body shape/weight- - No    Symptoms of " Binge eating: recurrent episodes of binge eating (definitely larger amount than what others would eat and lack of a sense of control over eating during episode) - No, 3 or more of following (eating much more rapidly, eating until uncomfortably full, large amounts when not hungry, eating alone because of embarrassed by how much,  feeling disgusted with oneself, depressed or very guilty afterward) - No, distress regarding binges - No, binges occur on average at least once a week for 3 months - No      Substance/s:  Taken in larger amounts or over longer periods than intended: No,  Persistent desire or unsuccessful attempts to cut down or stop: No,  Great deal of time spent seeking, using or recovering from: No,  Craving or strong desire to use: No,  Recurrent use despite failure to meet major role obligation: No,  Continued use despite persistent or recurrent social/interparsonal issues due to use: No,  Important social/work/recreational activities given up due to use: No,  Recurrent use in physically hazardous situations: No,  Continued use despite knowledge of persistent physical or psychological problem: No,  Tolerance (either increased need or diminished effect): No,      Psychotherapy: Not assessed  · Target symptoms: {PSY TARGET SYMPTOMS:42405}  · Why chosen therapy is appropriate versus another modality: {reason:02866}  · Outcome monitoring methods: {methods:12559}  · Therapeutic intervention type: {types:80149}  · Topics discussed/themes: {Topics:20456}  · The patient's response to the intervention is {PSY INTERVENTION RESPONSE:59177}. The patient's progress toward treatment goals is {Progress:20262}.   · Duration of intervention: *** minutes.      PAST PSYCHIATRIC HISTORY  Previous Psychiatric Hospitalizations: Yes  Previous SI/HI: No,  Previous Suicide Attempts: No,   Previous Medication Trials: Yes,  Psychiatric Care (current & past): No,  History of Psychotherapy: No,  History of Violence: No,  History of  "sexual/physical abuse: No,    PAST MEDICAL & SURGICAL HISTORY   Past Medical History:   Diagnosis Date    Anxiety     Depression     History of psychiatric hospitalization     Hx of psychiatric care     Psychiatric problem     Therapy      No past surgical history on file.  ***    CURRENT PSYCH MEDICATION REGIMEN   ***  Current Medication side effects:  None reported  Current Medication compliance:  Compliant    Previous psych meds trials  See below    Home Meds:   Prior to Admission medications    Medication Sig Start Date End Date Taking? Authorizing Provider   diphenhydrAMINE (BENADRYL) 50 MG capsule Take 2 capsules (100 mg total) by mouth every evening. 1/11/21   Sanchez Goldberg MD   esomeprazole (NEXIUM) 40 MG capsule Take 40 mg by mouth before breakfast.    Historical Provider   mirtazapine (REMERON) 15 MG tablet Take 1 tablet (15 mg total) by mouth every evening. 1/11/21 1/11/22  Sanchez Goldberg MD   risperiDONE (RISPERDAL) 3 MG Tab Take 1 tablet (3 mg total) by mouth 2 (two) times daily. 1/11/21 1/11/22  Sanchez Goldberg MD   sertraline (ZOLOFT) 100 MG tablet Take 1 tablet (100 mg total) by mouth once daily. 1/12/21 1/12/22  Sanchez Goldberg MD       ***  OTC Meds: Benadryl, Hydroxyzine    Scheduled Meds:    folic acid  1 mg Oral Daily    multivitamin  1 tablet Oral Daily    nicotine  1 patch Transdermal Daily    thiamine  100 mg Oral Daily      PRN Meds: acetaminophen, aluminum-magnesium hydroxide-simethicone, hydrOXYzine HCL, ibuprofen, magnesium hydroxide 400 mg/5 ml, OLANZapine **AND** OLANZapine   Psychotherapeutics (From admission, onward)            Start     Stop Route Frequency Ordered    03/29/22 1954  OLANZapine tablet 10 mg  (Olanzapine)        "And" Linked Group Details    -- Oral Every 4 hours PRN 03/29/22 1954 03/29/22 1954  OLANZapine injection 10 mg  (Olanzapine)        "And" Linked Group Details    -- IM Every 4 hours PRN 03/29/22 1954          ALLERGIES "   Review of patient's allergies indicates:  No Known Allergies    NEUROLOGIC HISTORY  Seizures: No  Head trauma: No    SOCIAL HISTORY:  Developmental/Childhood:Achieved all developmental milestones timely  Education:High School Diploma and some college  Employment Status/Finances:Employed   Relationship Status/Sexual Orientation: single  Children: 2  Housing Status: Home    history:  NO  Access to Firearms: NO;  Locked up? n/a  [employer]ybeterian:Non-practicing  Recreational activities:nothing    SUBSTANCE ABUSE HISTORY   Recreational Drugs: none   Use of Alcohol: minimal use  Rehab History:no   Tobacco Use:yes    LEGAL HISTORY:   Past charges/incarcerations: No   Pending charges:No     FAMILY PSYCHIATRIC HISTORY   History reviewed. No pertinent family history.    ***       ROS  Review of Systems   Constitutional: Positive for diaphoresis and malaise/fatigue.   HENT: Positive for tinnitus.    Cardiovascular: Positive for palpitations.   Musculoskeletal: Positive for falls.   Neurological: Positive for tremors.   Psychiatric/Behavioral: Positive for depression and hallucinations. The patient is nervous/anxious and has insomnia.      ***Patient reports falls occasionally when standing and performing regular daily activities..  Further, he admits that these falls are triggered by strong emotions. Patient also admits to experiencing hypnogogic and hypnopompic hallucinations. Patient denies falling asleep at random times during the day;instead the Patient reports a generalized  inability to sleep      EXAMINATION    PHYSICAL EXAM  Reviewed note/exam by Dr. Narvaez from ED at [facility]   Vitals:    03/30/22 0719   BP: 124/82   Pulse: 90   Resp: 18   Temp: 97.5 °F (36.4 °C)        Body mass index is 23.5 kg/m².        PAIN  0/10  Subjective report of pain matches objective signs and symptoms: Yes    LABORATORY DATA   Recent Results (from the past 72 hour(s))   Hemoglobin A1c    Collection Time: 03/29/22  1:33 PM    Result Value Ref Range    Hemoglobin A1C 5.4 4.0 - 5.6 %    Estimated Avg Glucose 108 68 - 131 mg/dL   Lipid panel    Collection Time: 03/29/22  1:33 PM   Result Value Ref Range    Cholesterol 141 120 - 199 mg/dL    Triglycerides 143 30 - 150 mg/dL    HDL 41 40 - 75 mg/dL    LDL Cholesterol 71.4 63.0 - 159.0 mg/dL    HDL/Cholesterol Ratio 29.1 20.0 - 50.0 %    Total Cholesterol/HDL Ratio 3.4 2.0 - 5.0    Non-HDL Cholesterol 100 mg/dL   CBC auto differential    Collection Time: 03/29/22  1:35 PM   Result Value Ref Range    WBC 7.96 3.90 - 12.70 K/uL    RBC 4.45 (L) 4.60 - 6.20 M/uL    Hemoglobin 14.2 14.0 - 18.0 g/dL    Hematocrit 42.7 40.0 - 54.0 %    MCV 96 82 - 98 fL    MCH 31.9 (H) 27.0 - 31.0 pg    MCHC 33.3 32.0 - 36.0 g/dL    RDW 13.3 11.5 - 14.5 %    Platelets 309 150 - 450 K/uL    MPV 9.9 9.2 - 12.9 fL    Immature Granulocytes 0.3 0.0 - 0.5 %    Gran # (ANC) 4.2 1.8 - 7.7 K/uL    Immature Grans (Abs) 0.02 0.00 - 0.04 K/uL    Lymph # 2.6 1.0 - 4.8 K/uL    Mono # 0.5 0.3 - 1.0 K/uL    Eos # 0.5 0.0 - 0.5 K/uL    Baso # 0.07 0.00 - 0.20 K/uL    nRBC 0 0 /100 WBC    Gran % 52.7 38.0 - 73.0 %    Lymph % 33.0 18.0 - 48.0 %    Mono % 6.7 4.0 - 15.0 %    Eosinophil % 6.4 0.0 - 8.0 %    Basophil % 0.9 0.0 - 1.9 %    Differential Method Automated    Comprehensive metabolic panel    Collection Time: 03/29/22  1:35 PM   Result Value Ref Range    Sodium 144 136 - 145 mmol/L    Potassium 3.5 3.5 - 5.1 mmol/L    Chloride 108 95 - 110 mmol/L    CO2 25 23 - 29 mmol/L    Glucose 114 (H) 70 - 110 mg/dL    BUN 14 6 - 20 mg/dL    Creatinine 1.1 0.5 - 1.4 mg/dL    Calcium 9.9 8.7 - 10.5 mg/dL    Total Protein 7.6 6.0 - 8.4 g/dL    Albumin 4.1 3.5 - 5.2 g/dL    Total Bilirubin 0.5 0.1 - 1.0 mg/dL    Alkaline Phosphatase 67 55 - 135 U/L     (H) 10 - 40 U/L    ALT 64 (H) 10 - 44 U/L    Anion Gap 11 8 - 16 mmol/L    eGFR if African American >60 >60 mL/min/1.73 m^2    eGFR if non African American >60 >60 mL/min/1.73 m^2   TSH     Collection Time: 03/29/22  1:35 PM   Result Value Ref Range    TSH 1.180 0.400 - 4.000 uIU/mL   Ethanol    Collection Time: 03/29/22  1:35 PM   Result Value Ref Range    Alcohol, Serum 71 (H) <10 mg/dL   Acetaminophen level    Collection Time: 03/29/22  1:35 PM   Result Value Ref Range    Acetaminophen (Tylenol), Serum <3.0 (L) 10.0 - 20.0 ug/mL   COVID-19 Rapid Screening    Collection Time: 03/29/22  1:35 PM   Result Value Ref Range    SARS-CoV-2 RNA, Amplification, Qual Negative Negative   Urinalysis, Reflex to Urine Culture Urine, Clean Catch    Collection Time: 03/29/22  6:37 PM    Specimen: Urine   Result Value Ref Range    Specimen UA Urine, Clean Catch     Color, UA Yellow Yellow, Straw, Yahaira    Appearance, UA Clear Clear    pH, UA 7.0 5.0 - 8.0    Specific Gravity, UA 1.020 1.005 - 1.030    Protein, UA Negative Negative    Glucose, UA Negative Negative    Ketones, UA Negative Negative    Bilirubin (UA) Negative Negative    Occult Blood UA Negative Negative    Nitrite, UA Negative Negative    Urobilinogen, UA 1.0 <2.0 EU/dL    Leukocytes, UA Negative Negative   Drug screen panel, emergency    Collection Time: 03/29/22  6:37 PM   Result Value Ref Range    Benzodiazepines Negative Negative    Methadone metabolites Negative Negative    Cocaine (Metab.) Negative Negative    Opiate Scrn, Ur Negative Negative    Barbiturate Screen, Ur Negative Negative    Amphetamine Screen, Ur Negative Negative    THC Negative Negative    Phencyclidine Negative Negative    Creatinine, Urine 207.1 23.0 - 375.0 mg/dL    Toxicology Information SEE COMMENT       No results found for: PHENYTOIN, PHENOBARB, VALPROATE, CBMZ        CONSTITUTIONAL  General Appearance: age appropriate, disheveled    MUSCULOSKELETAL  Muscle Strength and Tone:not examined, no tremor, no tic  Abnormal Involuntary Movements: No  Gait and Station: non-ataxic    PSYCHIATRIC   Level of Consciousness: awake, alert  and oriented  Orientation: person, place,  situation and year  Grooming: Hospital garb  Psychomotor Behavior: cooperative, psychomotor retardation, eye contact minimal  Speech: slowed, delayed, increased latency of response, soft, aphasic  Language: grossly intact  Mood: anxious, depressed and sad  Affect: Anxious, Consistent with mood, Flat and Constricted  Thought Process: linear, logical and blocking  Associations: intact   Thought Content: denies SI and denies HI  Perceptions: +AH and denies  VH  Memory: Able to recall past events, Remote intact and Recent intact  Attention:Decreased  Fund of Knowledge: Aware of current events  Estimate if Intelligence:  Above average based on work/education history, vocabulary and mental status exam  Insight: has awareness of illness  Judgment: behavior is adequate to circumstances      PSYCHOSOCIAL    PSYCHOSOCIAL STRESSORS   None    FUNCTIONING RELATIONSHIPS   alone & isolated    STRENGTHS AND LIABILITIES   Strength: Patient accepts guidance/feedback, Strength: Patient is motivated for change., Strength: Patient is physically healthy., Strength: Patient has reasonable judgment.    Is the patient aware of the biomedical complications associated with substance abuse and mental illness? yes    Does the patient have an Advance Directive for Mental Health treatment? no  (If yes, inform patient to bring copy.)        MEDICAL DECISION MAKING        ASSESSMENT       Schizophrenia with AH and negative symptoms.  Narcolepsy sx's (See ROS)  (Schizophrenia with co-morbid narcolepsy)        PROBLEM LIST AND MANAGEMENT PLANS    Schizophrenia with AH and negative symptoms.-> Atypical Antipsychotic  Narcolepsy sx's -> polysomnograph or multiple sleep latency test      PRESCRIPTION DRUG MANAGEMENT  Compliance: no  Side Effects: unknown  Regimen Adjustments: see above    Discussed diagnosis, risks and benefits of proposed treatment vs alternative treatments vs no treatment, potential side effects of these treatments and the inherent  unpredictability of treatment. The patient expresses understanding of the above and displays the capacity to agree with this treatment given said understanding. Patient also agrees that, currently, the benefits outweigh the risks and would like to pursue/continue treatment at this time.      DIAGNOSTIC TESTING  Labs reviewed with patient; follow up pending labs    Disposition:  -Will attempt to obtain outside psychiatric records if available  -SW to assist with aftercare planning and collateral  -Once stable discharge home with outpatient follow up care and/or rehab  -Continue inpatient treatment under a PEC and/or CEC for danger to self/ danger to others/grave disability as evident by significant psychotic thought disorder        Ericka Soares MD-3  Psychiatry

## 2022-03-30 NOTE — HPI
42yo male patient with no medical history presented to ER with hallucinations. Admitted to U and medicine was asked to do H/P. VSS/afebrile, labs reviewed, elevated L:FT and ETOH level

## 2022-03-30 NOTE — PLAN OF CARE
"  Problem: Adult Behavioral Health Plan of Care  Goal: Optimized Coping Skills in Response to Life Stressors  Outcome: Ongoing, Progressing  Intervention: Promote Effective Coping Strategies  Flowsheets (Taken 3/30/2022 1611)  Supportive Measures:   active listening utilized   counseling provided   decision-making supported   verbalization of feelings encouraged   Group Note:  Pt did not attend group. PLPC met with pt individually. Pt requested to be excused from group today. Pt was resting in bed quietly and stated " I am tired and I am not feeling well". PLPC discussed with pt what group psychotherapy focused on this afternoon. PLPC discussed with pt the group focused on the Values Discussion Cards. Patients practiced how personal values are things that are most important to us. Values provide direction and give meaning to life. Pts explored and discussed their values of their lives.on PLPC educated pt on the importance of attending group. PLPC also educated pt on the importance of attending group.  "

## 2022-03-30 NOTE — H&P
"PSYCHIATRY INPATIENT ADMISSION NOTE - H & P      3/30/2022 10:18 AM   Jean Cruz   1980   [id number]         DATE OF ADMISSION: 3/29/2022  7:39 PM    SITE: [facility]    CURRENT LEGAL STATUS: PEC and/or CEC      HISTORY    CHIEF COMPLAINT   Jean Cruz is a 41 y.o. male with a past psychiatric history of Schizophrenia, depression, anxiety currently admitted to the inpatient unit with the following chief complaint: hallucinations      HPI   The patient was seen and examined. The chart was reviewed.    The patient presented to the ER on 3/29/2022 with complaints of hallucinations    The patient was medically cleared and admitted to the [facility].        Per RN:  Patient to ED with c/o hearing voices. Patient reports "my mind is restless." Patient denies HI or SI.    Per ED MD:  41-year-old male with history of anxiety presenting with 2-3 days of altered mood and auditory hallucinations.  He is here with his brother he states that patient was hanging out with a female friend of his, and he notice that when he returned from work (his brother works as a , and is ofte gone for weeks at a time) he notice that his brother was acting differently.  Patient denies any suicidal or homicidal ideations.  States that he feels as though he "can't live in the well today".  Denies visual hallucinations.      Psychiatric interview:  Patient states that his auditory hallucinations, depressed mood with anxiety are his main complaints. Upon inquiring further about AH, the patient was reluctant and declined to describe the voices and the content of what these voices were saying, would only disclose one example, "dicks in your mouth." The patient also admits to the voices directing him to do things. The patient reports as feeling uncomfortable describing the content of the voices. Patient admits to medication non compliance and has not been following up with mental health provider.        Symptoms of Depression: " diminished mood - Yes, loss of interest/anhedonia - Yes;  recurrent - Yes, >14 days - Yes, diminished energy - No, change in sleep - Yes, change in appetite - No, diminished concentration or cognition or indecisiveness - Yes, PMA/R -  Yes, excessive guilt or hopelessness or worthlessness - Yes, suicidal ideations - No      Changes in Sleep: trouble with initiation- Yes, maintenance, - Yes early morning awakening with inability to return to sleep - Yes, hypersomnolence - No     Suicidal- active/passive ideations - No, organized plans, future intentions - No     Homicidal ideations: active/passive ideations - No, organized plans, future intentions - No     Symptoms of psychosis: hallucinations - Yes, delusions - Yes, disorganized speech - Yes, disorganized behavior or abnormal motor behavior - No, or negative symptoms (diminshed emotional expression, avolition, anhedonia, alogia, asociality) - Yes, active phase symptoms >1 month - Yes, continuous signs of illness > 6 months - Yes, since onset of illness decreased level of functioning present - Yes     Symptoms of janna or hypomania: elevated, expansive, or irritable mood with increased energy or activity - No; > 4 days - No,  >7 days - No; with inflated self-esteem or grandiosity - No, decreased need for sleep - No, increased rate of speech - No, FOI or racing thoughts - Yes, distractibility - Yes, increased goal directed activity or PMA - No, risky/disinhibited behavior - No     Symptoms of DORIAN: excessive anxiety/worry/fear, more days than not, about numerous issues - Yes, ongoing for >6 months - Yes, difficult to control - Yes, with restlessness - Yes, fatigue - yes, poor concentration - Yes, irritability - Yes, muscle tension - No, sleep disturbance - Yes; causes functionally impairing distress - Yes     Symptoms of Panic Disorder: recurrent panic attacks (palpitations/heart racing, sweating, shakiness, dyspnea, choking, chest pain/discomfort, Gi symptoms,  "dizzy/lightheadedness, hot/col flashes, paresthesias, derealization, fear of losing control or fear of dying or fear of "going crazy") - Yes, precipitated - No, un-precipitated - Yes, source of worry and/or behavioral changes secondary for 1 month or longer- Yes, agoraphobia - No     Symptoms of PTSD: h/o trauma exposure - No; re-experiencing/intrusive symptoms - No, avoidant behavior - No, 2 or more negative alterations in cognition or mood - No, 2 or more hyperarousal symptoms - No; with dissociative symptoms - No, ongoing for 1 or more  months - No     Symptoms of OCD: obsessions (recurrent thoughts/urges/images; intrusive and/or unwanted; uses other thoughts/actions to suppress) - No; compulsions (repetitive behaviors used to lower distress/anxiety/obsessions) - No, time-consuming (over 1 hour per day) or cause significant distress/impairment - - No     Symptoms of Anorexia: restriction of caloric intake leading to significantly low body weight - No, intense fear of gaining weight or persistent behavior that interferes with weight gain even thought at a significantly low weight - No, disturbance in the way in which one's body weight or shape is experienced, undue influence of body weight or shape on self evaluation, or persistent lack of recognition of the seriousness of the current low body weight - No     Symptoms of Bulimia: recurrent episodes of binge eating (definitely larger amount  than what others would eat and lack of a sense of control over eating during episode) - No, recurrent inappropriate compensatory behaviors in order to prevent weight gain (fasting, medications, exercise, vomiting) - No, binges and compensatory behaviors both occur on average at least once a week for 3 months - No, self evaluations is unduly influenced by body shape/weight- - No            Per chart review:  The patient presented to the ER on 1/1/21 with complaints of psychosis. Per the staff notes:  -40 year-old male admitted " "to [facility] from [facility] ER accompanied by [facility] and . Patient is oriented x4 and is cooperative. No contraband noted with metal detector scan and skin assessment.       Patient states he was having "bad thoughts and feels jittery". Admits to having auditory hallucinations but declines to talk about them. Denies delusions, homicidal and suicidal ideation. Patient is reluctant to answer questions and is a poor historian. When asked about stressors, stated he was in conflict with several family members. States he lives alone and works full-time as a . He states he has 1.5 years of college and was arrested 5 years ago because he was in an altercation. He has no outstanding court dates. He states he drinks 2 cans of beer per day for approximately 10 years. He denies current drug use but has a past history of taking 2 to 3 Lortabs per day. States stopped abusing hydrocodone 3 years ago with psychiatric care. States smokes 1.5 packs cigarettes per day and is not ready to quit. He initially denied having any health problems but at the end of the interview he stated he has "AIDS". When questioned, he was unable to answer how long he has had it or who treated him for it. He states he sees a doctor in the [facility] but is unable to name the particular physician. Unable to find any information regarding HIV status in medical record nor was it reported by [facility] ER nurse prior to admit.     The patient was medically cleared and admitted to the [facility].      The patient reports that he has been hearing voices over the last year with increasing frequency and intensity. He was vague and guarded in describing them. He reports that he told his sister about them yesterday, she became concerned, and he was brought to the hospital.      He denied current depression symptoms, but later reported having "ups and downs" and recent depressive symptoms. He also reports anxiety issues.      He was very vague and guarded during " "throughout the interview and often gave conflicting information.            Psychotherapy:  · Target symptoms: psychosis  · Why chosen therapy is appropriate versus another modality: relevant to diagnosis  · Outcome monitoring methods: self-report  · Therapeutic intervention type: supportive psychotherapy  · Topics discussed/themes: building skills sets for symptom management, symptom recognition  · The patient's response to the intervention is accepting. The patient's progress toward treatment goals is fair.   · Duration of intervention: 17 minutes.        PAST PSYCHIATRIC HISTORY  Previous Psychiatric Hospitalizations: Yes, reports twice prior for "hearing voices"  Previous SI/HI: No,  Previous Suicide Attempts: No,   Previous Medication Trials: Yes, risperal, remeron, zoloft  Psychiatric Care (current & past): yes, "Dr. Espinoza in [place]"  History of Psychotherapy: No,  History of Violence: No,  History of sexual/physical abuse: No,        PAST MEDICAL & SURGICAL HISTORY   Past Medical History:   Diagnosis Date    Anxiety     Depression     History of psychiatric hospitalization     Hx of psychiatric care     Psychiatric problem     Therapy      No past surgical history on file.        Home Meds:   Prior to Admission medications    Medication Sig Start Date End Date Taking? Authorizing Provider   diphenhydrAMINE (BENADRYL) 50 MG capsule Take 2 capsules (100 mg total) by mouth every evening. 1/11/21   Sanchez Goldberg MD   esomeprazole (NEXIUM) 40 MG capsule Take 40 mg by mouth before breakfast.    Historical Provider   mirtazapine (REMERON) 15 MG tablet Take 1 tablet (15 mg total) by mouth every evening. 1/11/21 1/11/22  Sanchez Goldberg MD   risperiDONE (RISPERDAL) 3 MG Tab Take 1 tablet (3 mg total) by mouth 2 (two) times daily. 1/11/21 1/11/22  Sanchez Goldberg MD   sertraline (ZOLOFT) 100 MG tablet Take 1 tablet (100 mg total) by mouth once daily. 1/12/21 1/12/22  Sanchez Goldberg, " "MD           Scheduled Meds:    folic acid  1 mg Oral Daily    multivitamin  1 tablet Oral Daily    nicotine  1 patch Transdermal Daily    thiamine  100 mg Oral Daily      PRN Meds: acetaminophen, aluminum-magnesium hydroxide-simethicone, hydrOXYzine HCL, ibuprofen, magnesium hydroxide 400 mg/5 ml, OLANZapine **AND** OLANZapine   Psychotherapeutics (From admission, onward)            Start     Stop Route Frequency Ordered    03/29/22 1954  OLANZapine tablet 10 mg  (Olanzapine)        "And" Linked Group Details    -- Oral Every 4 hours PRN 03/29/22 1954 03/29/22 1954  OLANZapine injection 10 mg  (Olanzapine)        "And" Linked Group Details    -- IM Every 4 hours PRN 03/29/22 1954          ALLERGIES   Review of patient's allergies indicates:  No Known Allergies    NEUROLOGIC HISTORY  Seizures: No  Head trauma: No    SOCIAL HISTORY:  Developmental/Childhood: Achieved all developmental milestones timely  Education: High School Diploma and some college  Employment Status/Finances: Employed,  Relationship Status/Sexual Orientation: single  Children: 2  Housing Status: Home in [place]   history:  NO  Access to Firearms: NO;  Locked up? n/a  Sabianist:Non-practicing  Recreational activities: "I don't go no where"      SUBSTANCE ABUSE HISTORY   Recreational Drugs: none   Use of Alcohol: minimal use  Rehab History: no   Tobacco Use: yes     LEGAL HISTORY:   Past charges/incarcerations: yes, "maybe for a day"  Pending charges:No          FAMILY PSYCHIATRIC HISTORY   Denies      ROS  Review of Systems   Constitutional: Negative for chills and fever.   HENT: Negative for hearing loss.    Eyes: Negative for blurred vision and double vision.   Respiratory: Negative for shortness of breath.    Cardiovascular: Negative for chest pain and palpitations.   Gastrointestinal: Negative for constipation, diarrhea, nausea and vomiting.   Genitourinary: Negative for dysuria.   Musculoskeletal: Negative for back pain " and neck pain.   Skin: Negative for rash.   Neurological: Negative for dizziness and headaches.   Endo/Heme/Allergies: Negative for environmental allergies.         EXAMINATION    PHYSICAL EXAM  Reviewed note/exam by Dr. Efrain Narvaez MD  03/29/22 1343      VITALS   Vitals:    03/30/22 0719   BP: 124/82   Pulse: 90   Resp: 18   Temp: 97.5 °F (36.4 °C)        Body mass index is 23.5 kg/m².        PAIN  0/10  Subjective report of pain matches objective signs and symptoms: Yes    LABORATORY DATA   Recent Results (from the past 72 hour(s))   Hemoglobin A1c    Collection Time: 03/29/22  1:33 PM   Result Value Ref Range    Hemoglobin A1C 5.4 4.0 - 5.6 %    Estimated Avg Glucose 108 68 - 131 mg/dL   Lipid panel    Collection Time: 03/29/22  1:33 PM   Result Value Ref Range    Cholesterol 141 120 - 199 mg/dL    Triglycerides 143 30 - 150 mg/dL    HDL 41 40 - 75 mg/dL    LDL Cholesterol 71.4 63.0 - 159.0 mg/dL    HDL/Cholesterol Ratio 29.1 20.0 - 50.0 %    Total Cholesterol/HDL Ratio 3.4 2.0 - 5.0    Non-HDL Cholesterol 100 mg/dL   CBC auto differential    Collection Time: 03/29/22  1:35 PM   Result Value Ref Range    WBC 7.96 3.90 - 12.70 K/uL    RBC 4.45 (L) 4.60 - 6.20 M/uL    Hemoglobin 14.2 14.0 - 18.0 g/dL    Hematocrit 42.7 40.0 - 54.0 %    MCV 96 82 - 98 fL    MCH 31.9 (H) 27.0 - 31.0 pg    MCHC 33.3 32.0 - 36.0 g/dL    RDW 13.3 11.5 - 14.5 %    Platelets 309 150 - 450 K/uL    MPV 9.9 9.2 - 12.9 fL    Immature Granulocytes 0.3 0.0 - 0.5 %    Gran # (ANC) 4.2 1.8 - 7.7 K/uL    Immature Grans (Abs) 0.02 0.00 - 0.04 K/uL    Lymph # 2.6 1.0 - 4.8 K/uL    Mono # 0.5 0.3 - 1.0 K/uL    Eos # 0.5 0.0 - 0.5 K/uL    Baso # 0.07 0.00 - 0.20 K/uL    nRBC 0 0 /100 WBC    Gran % 52.7 38.0 - 73.0 %    Lymph % 33.0 18.0 - 48.0 %    Mono % 6.7 4.0 - 15.0 %    Eosinophil % 6.4 0.0 - 8.0 %    Basophil % 0.9 0.0 - 1.9 %    Differential Method Automated    Comprehensive metabolic panel    Collection Time: 03/29/22  1:35 PM   Result  Value Ref Range    Sodium 144 136 - 145 mmol/L    Potassium 3.5 3.5 - 5.1 mmol/L    Chloride 108 95 - 110 mmol/L    CO2 25 23 - 29 mmol/L    Glucose 114 (H) 70 - 110 mg/dL    BUN 14 6 - 20 mg/dL    Creatinine 1.1 0.5 - 1.4 mg/dL    Calcium 9.9 8.7 - 10.5 mg/dL    Total Protein 7.6 6.0 - 8.4 g/dL    Albumin 4.1 3.5 - 5.2 g/dL    Total Bilirubin 0.5 0.1 - 1.0 mg/dL    Alkaline Phosphatase 67 55 - 135 U/L     (H) 10 - 40 U/L    ALT 64 (H) 10 - 44 U/L    Anion Gap 11 8 - 16 mmol/L    eGFR if African American >60 >60 mL/min/1.73 m^2    eGFR if non African American >60 >60 mL/min/1.73 m^2   TSH    Collection Time: 03/29/22  1:35 PM   Result Value Ref Range    TSH 1.180 0.400 - 4.000 uIU/mL   Ethanol    Collection Time: 03/29/22  1:35 PM   Result Value Ref Range    Alcohol, Serum 71 (H) <10 mg/dL   Acetaminophen level    Collection Time: 03/29/22  1:35 PM   Result Value Ref Range    Acetaminophen (Tylenol), Serum <3.0 (L) 10.0 - 20.0 ug/mL   COVID-19 Rapid Screening    Collection Time: 03/29/22  1:35 PM   Result Value Ref Range    SARS-CoV-2 RNA, Amplification, Qual Negative Negative   Urinalysis, Reflex to Urine Culture Urine, Clean Catch    Collection Time: 03/29/22  6:37 PM    Specimen: Urine   Result Value Ref Range    Specimen UA Urine, Clean Catch     Color, UA Yellow Yellow, Straw, Yahaira    Appearance, UA Clear Clear    pH, UA 7.0 5.0 - 8.0    Specific Gravity, UA 1.020 1.005 - 1.030    Protein, UA Negative Negative    Glucose, UA Negative Negative    Ketones, UA Negative Negative    Bilirubin (UA) Negative Negative    Occult Blood UA Negative Negative    Nitrite, UA Negative Negative    Urobilinogen, UA 1.0 <2.0 EU/dL    Leukocytes, UA Negative Negative   Drug screen panel, emergency    Collection Time: 03/29/22  6:37 PM   Result Value Ref Range    Benzodiazepines Negative Negative    Methadone metabolites Negative Negative    Cocaine (Metab.) Negative Negative    Opiate Scrn, Ur Negative Negative     Barbiturate Screen, Ur Negative Negative    Amphetamine Screen, Ur Negative Negative    THC Negative Negative    Phencyclidine Negative Negative    Creatinine, Urine 207.1 23.0 - 375.0 mg/dL    Toxicology Information SEE COMMENT       No results found for: PHENYTOIN, PHENOBARB, VALPROATE, CBMZ        CONSTITUTIONAL  General Appearance: unremarkable, age appropriate    MUSCULOSKELETAL  Muscle Strength and Tone:no tremor, no tic  Abnormal Involuntary Movements: No  Gait and Station: non-ataxic    PSYCHIATRIC   Level of Consciousness: awake and alert   Orientation: person, place and situation  Grooming: Casually dressed and Well groomed  Psychomotor Behavior: normal, cooperative  Speech: normal tone, normal rate, normal pitch, normal volume  Language: grossly intact  Mood: depressed  Affect: Consistent with mood  Thought Process: circumstantial  Associations: intact   Thought Content: denies SI, denies HI and no delusions  Perceptions: +AH  Memory: Able to recall past events, Remote intact and Recent intact  Attention:Attends to interview without distraction  Fund of Knowledge: Aware of current events and Vocabulary appropriate   Estimate if Intelligence:  Average based on work/education history, vocabulary and mental status exam  Insight: has awareness of illness  Judgment: behavior is adequate to circumstances        PSYCHOSOCIAL    PSYCHOSOCIAL STRESSORS   family    FUNCTIONING RELATIONSHIPS   good support system    STRENGTHS AND LIABILITIES   Strength: Patient accepts guidance/feedback, Strength: Patient is expressive/articulate., Liability: Patient is impulsive., Liability: Patient lacks coping skills.    Is the patient aware of the biomedical complications associated with substance abuse and mental illness? yes    Does the patient have an Advance Directive for Mental Health treatment? no  (If yes, inform patient to bring copy.)        MEDICAL DECISION MAKING        ASSESSMENT       Schizoaffective disorder,  depressed type, chronic, with acute exacerbation, severe  DORIAN  Panic disorder    Nicotine dependence    Elevated LFTs    Alcohol abuse        PROBLEM LIST AND MANAGEMENT PLANS      Schizoaffective disorder, depressed type, chronic, with acute exacerbation, severe  - start zyprexa 5 mg PO qhs  - consider AD if needed  - follow up with outpatient mental health provider after discharge  - pt counseled    DORIAN  - start hydroxyzine 50 mg PO q 6 hours PRN  - consider AD if needed  - follow up with outpatient mental health provider after discharge  - pt counseled    Panic disorder  - start hydroxyzine 50 mg PO q 6 hours PRN  - consider AD if needed  - follow up with outpatient mental health provider after discharge  - pt counseled    Nicotine dependence  - start nicotine patch 14 mg PO qd PRN      Elevated LFTs  - possibly alcohol related  - consider hep panel  - monitor    Alcohol abuse  - SW consulted for assistance with additional resources   -  patient  - monitor for w/d          PRESCRIPTION DRUG MANAGEMENT  Compliance: no  Side Effects: unknown  Regimen Adjustments: see above    Discussed diagnosis, risks and benefits of proposed treatment vs alternative treatments vs no treatment, potential side effects of these treatments and the inherent unpredictability of treatment. The patient expresses understanding of the above and displays the capacity to agree with this treatment given said understanding. Patient also agrees that, currently, the benefits outweigh the risks and would like to pursue/continue treatment at this time.      DIAGNOSTIC TESTING  Labs reviewed with patient; follow up pending labs    Disposition:  -Will attempt to obtain outside psychiatric records if available  -SW to assist with aftercare planning and collateral  -Once stable discharge home with outpatient follow up care and/or rehab  -Continue inpatient treatment under a PEC and/or CEC for danger to self/ danger to others/grave disability as  evident by gravely disabled        Zaid Matson III, MD  Psychiatry

## 2022-03-31 LAB
ALBUMIN SERPL BCP-MCNC: 3.8 G/DL (ref 3.5–5.2)
ALP SERPL-CCNC: 59 U/L (ref 55–135)
ALT SERPL W/O P-5'-P-CCNC: 63 U/L (ref 10–44)
ANION GAP SERPL CALC-SCNC: 11 MMOL/L (ref 8–16)
AST SERPL-CCNC: 66 U/L (ref 10–40)
BILIRUB SERPL-MCNC: 0.4 MG/DL (ref 0.1–1)
BUN SERPL-MCNC: 19 MG/DL (ref 6–20)
CALCIUM SERPL-MCNC: 9.4 MG/DL (ref 8.7–10.5)
CHLORIDE SERPL-SCNC: 106 MMOL/L (ref 95–110)
CO2 SERPL-SCNC: 26 MMOL/L (ref 23–29)
CREAT SERPL-MCNC: 1.2 MG/DL (ref 0.5–1.4)
EST. GFR  (AFRICAN AMERICAN): >60 ML/MIN/1.73 M^2
EST. GFR  (NON AFRICAN AMERICAN): >60 ML/MIN/1.73 M^2
GLUCOSE SERPL-MCNC: 101 MG/DL (ref 70–110)
HAV IGM SERPL QL IA: NEGATIVE
HBV CORE IGM SERPL QL IA: NEGATIVE
HBV SURFACE AG SERPL QL IA: NEGATIVE
HCV AB SERPL QL IA: NEGATIVE
POTASSIUM SERPL-SCNC: 4.8 MMOL/L (ref 3.5–5.1)
PROT SERPL-MCNC: 6.8 G/DL (ref 6–8.4)
SODIUM SERPL-SCNC: 143 MMOL/L (ref 136–145)

## 2022-03-31 PROCEDURE — 90833 PSYTX W PT W E/M 30 MIN: CPT | Mod: ,,, | Performed by: STUDENT IN AN ORGANIZED HEALTH CARE EDUCATION/TRAINING PROGRAM

## 2022-03-31 PROCEDURE — 80053 COMPREHEN METABOLIC PANEL: CPT | Performed by: NURSE PRACTITIONER

## 2022-03-31 PROCEDURE — 99233 SBSQ HOSP IP/OBS HIGH 50: CPT | Mod: ,,, | Performed by: STUDENT IN AN ORGANIZED HEALTH CARE EDUCATION/TRAINING PROGRAM

## 2022-03-31 PROCEDURE — 90833 PR PSYCHOTHERAPY W/PATIENT W/E&M, 30 MIN (ADD ON): ICD-10-PCS | Mod: ,,, | Performed by: STUDENT IN AN ORGANIZED HEALTH CARE EDUCATION/TRAINING PROGRAM

## 2022-03-31 PROCEDURE — 99233 PR SUBSEQUENT HOSPITAL CARE,LEVL III: ICD-10-PCS | Mod: ,,, | Performed by: STUDENT IN AN ORGANIZED HEALTH CARE EDUCATION/TRAINING PROGRAM

## 2022-03-31 PROCEDURE — 25000003 PHARM REV CODE 250: Performed by: STUDENT IN AN ORGANIZED HEALTH CARE EDUCATION/TRAINING PROGRAM

## 2022-03-31 PROCEDURE — 80074 ACUTE HEPATITIS PANEL: CPT | Performed by: NURSE PRACTITIONER

## 2022-03-31 PROCEDURE — 36415 COLL VENOUS BLD VENIPUNCTURE: CPT | Performed by: NURSE PRACTITIONER

## 2022-03-31 PROCEDURE — 11400000 HC PSYCH PRIVATE ROOM

## 2022-03-31 RX ADMIN — OLANZAPINE 7.5 MG: 5 TABLET, FILM COATED ORAL at 08:03

## 2022-03-31 RX ADMIN — FOLIC ACID 1 MG: 1 TABLET ORAL at 08:03

## 2022-03-31 RX ADMIN — THERA TABS 1 TABLET: TAB at 08:03

## 2022-03-31 RX ADMIN — Medication 100 MG: at 08:03

## 2022-03-31 NOTE — PROGRESS NOTES
"   03/31/22 9878   Assessment   Patient's Identification of the Problem Patient presents with poor eye contact, looking down at the floor, depressed, reports "agitated, depressed" mood, states "I'm just tired of the same old, same old... feel like people know what I'm thinking everywhere I go... when I talk the voices intensify." Patient reports his admit is due to "hearing voices, filthy, bad stuff that I don't care to repeat." Patient reports he has been off his medications for several months, admits to negative leisure lifestyle of cigarettes, and alcohol(lately at night to sleep). Patient reports he is single, has 2 children, some college, employed as a , lives alone in [place]. Patient verbalized his main goal "take medicine that work... the voices to go away."   Leisure Interest Fishing;Sleep   Leisure Barriers Loss of Interest;ETOH Use;Self Confidence;Fears/Phobias;Other (See Comments)  (fear being around people)   Treatment Focus To Improve Mood;To Decrease Agitation and Increase Frustration Tolerance;To Improve Leisure Awareness/Lifestyle/Interest;To Improve Coping Skills;To Educate and Increase Awareness of Sober Free Activities     Treatment Recommendation:   1:1 Intervention (as needed)    Cognitive Stimulation Skilled Activity  Mild Exercises Skilled Activity  Coping Skilled Activity    Treatment Goal(s):  Long Term Goals Refer To Master Treatment Plan    Short Term Treatment Goal(s)  Patient Will:  Comply with Treatment  Exhibit Improvement in Mood  Demonstrate Improvement in Thought Processes / Awareness  Identify at Least 2 Coping Skills or Leisure Skills to Reduce Depression and Hopelessness Upon Request from Therapist  Identify (+) Leisure / Recreation Activities that Promote Wellness and Promote a Sober Lifestyle    Discharge Recommendations:  Encourage Patient to Utilize Coping Skills on a Regular Basis to Reduce the Risk of Decompensating and Re-Hospitalizations  Follow Up with After " Care Appointments

## 2022-03-31 NOTE — MEDICAL/APP STUDENT
"PSYCHIATRY DAILY INPATIENT PROGRESS NOTE  SUBSEQUENT HOSPITAL VISIT    ENCOUNTER DATE: 3/31/2022  [facility]    DATE OF ADMISSION: 3/29/2022  7:39 PM  LENGTH OF STAY: 2 days      CHIEF COMPLAINT   Jean Cruz is a 41 y.o. male, seen during daily vee rounds on the inpatient unit.  Jean Cruz presented with the chief complaint of Schizophrenia      The patient was seen and examined. The chart was reviewed.     Reviewed notes from Rns and MD and labs from the last 24 hours.    The patient's case was discussed with the treatment team/care providers today including Rns and MD    Staff reports no behavioral or management issues.     The patient has been compliant with treatment.      Subjective 03/31/2022       Today the patient reports "doinf good."      The patient denies any side effects to medications.        Interim/overnight events per report/notes: Reviewed        Psychiatric ROS (observed, reported, or endorsed/denied):  Depressed mood - denies  Interest/pleasure/anhedonia: endorses  Guilt/hopelessness/worthlessness - Yes  Changes in Sleep - denies  Changes in Appetite - denies  Changes in Concentration - endorses  Changes in Energy - endorses  PMA/R- denies  Suicidal- active/passive ideations - denies  Homicidal ideations: active/passive ideations - denies    Hallucinations - endorses  Delusions - endorses  Disorganized behavior - denies  Disorganized speech - denies  Negative symptoms - improving minimally    Elevated mood - denies  Decreased need for sleep - denies  Grandiosity - denies  Racing thoughts - denies  Impulsivity - denies  Irritability- denies  Increased energy - denies  Distractibility - denies  Increase in goal-directed activity or PMA- denies    Symptoms of DORIAN - endorses  Symptoms of Panic Disorder- denies  Symptoms of PTSD - denies        Overall progress: Patient is showing mild improvement         Psychotherapy:  · Target symptoms: {PSY TARGET SYMPTOMS:76796}  · Why " chosen therapy is appropriate versus another modality: {reason:88614}  · Outcome monitoring methods: {methods:29170}  · Therapeutic intervention type: {types:98089}  · Topics discussed/themes: {Topics:20403}  · The patient's response to the intervention is {PSY INTERVENTION RESPONSE:72027}. The patient's progress toward treatment goals is {Progress:20262}.   · Duration of intervention: *** minutes.        Medical ROS  Review of Systems   Constitutional: Positive for chills and diaphoresis.   HENT: Negative.    Eyes: Negative.    Respiratory: Positive for cough.    Cardiovascular: Negative.    Gastrointestinal: Negative.    Genitourinary: Negative.    Musculoskeletal: Negative.    Skin: Negative.    Neurological: Positive for tingling.   Endo/Heme/Allergies: Negative.    Psychiatric/Behavioral: Positive for hallucinations. The patient is nervous/anxious.          PAST MEDICAL HISTORY   Past Medical History:   Diagnosis Date    Anxiety     Depression     History of psychiatric hospitalization     Hx of psychiatric care     Psychiatric problem     Therapy            PSYCHOTROPIC MEDICATIONS   Scheduled Meds:   folic acid  1 mg Oral Daily    multivitamin  1 tablet Oral Daily    nicotine  1 patch Transdermal Daily    OLANZapine  5 mg Oral QHS    thiamine  100 mg Oral Daily     Continuous Infusions:  PRN Meds:.acetaminophen, aluminum-magnesium hydroxide-simethicone, hydrOXYzine HCL, ibuprofen, magnesium hydroxide 400 mg/5 ml, OLANZapine **AND** OLANZapine        EXAMINATION    VITALS   Vitals:    03/29/22 1940 03/30/22 0719 03/30/22 2002 03/31/22 0729   BP: (!) 170/85 124/82 126/75 (!) 114/54   BP Location:  Left arm Right arm    Patient Position: Sitting Lying Sitting    Pulse: 87 90 85 83   Resp: 18 18 18 18   Temp: 97.8 °F (36.6 °C) 97.5 °F (36.4 °C) 98.1 °F (36.7 °C) 97.9 °F (36.6 °C)   TempSrc:  Temporal  Temporal   SpO2: 98%      Weight: 78.6 kg (173 lb 4.5 oz)      Height: 6' (1.829 m)          Body mass  index is 23.5 kg/m².        CONSTITUTIONAL  General Appearance: age appropriate, disheveled    MUSCULOSKELETAL  Muscle Strength and Tone:spasticity noted no, no tremor, no tic  Abnormal Involuntary Movements: No  Gait and Station: non-ataxic    PSYCHIATRIC   Level of Consciousness: awake and alert   Orientation: person, place, situation, month of year and year  Grooming: Disheveled and Hospital garb  Psychomotor Behavior: normal, cooperative  Speech: slowed, soft, aphasic, monotone  Language: grossly intact  Mood: neutral  Affect: Consistent with mood  Thought Process: linear, logical  Associations: intact   Thought Content: +delusions, denies SI and denies HI  Perceptions: +AH and denies  VH  Memory: Able to recall past events, Remote intact and Recent intact  Attention:Attends to interview without distraction  Fund of Knowledge: Aware of current events and Vocabulary appropriate   Estimate if Intelligence:  Above average based on work/education history, vocabulary and mental status exam  Insight: has awareness of illness  Judgment: behavior is adequate to circumstances        DIAGNOSTIC TESTING   Laboratory Results  Recent Results (from the past 24 hour(s))   Comprehensive Metabolic Panel    Collection Time: 03/31/22  6:34 AM   Result Value Ref Range    Sodium 143 136 - 145 mmol/L    Potassium 4.8 3.5 - 5.1 mmol/L    Chloride 106 95 - 110 mmol/L    CO2 26 23 - 29 mmol/L    Glucose 101 70 - 110 mg/dL    BUN 19 6 - 20 mg/dL    Creatinine 1.2 0.5 - 1.4 mg/dL    Calcium 9.4 8.7 - 10.5 mg/dL    Total Protein 6.8 6.0 - 8.4 g/dL    Albumin 3.8 3.5 - 5.2 g/dL    Total Bilirubin 0.4 0.1 - 1.0 mg/dL    Alkaline Phosphatase 59 55 - 135 U/L    AST 66 (H) 10 - 40 U/L    ALT 63 (H) 10 - 44 U/L    Anion Gap 11 8 - 16 mmol/L    eGFR if African American >60 >60 mL/min/1.73 m^2    eGFR if non African American >60 >60 mL/min/1.73 m^2       LFT's have decreased since yesterday.      MEDICAL DECISION MAKING      ASSESSMENT:      Schizoaffective disorder, depressed type, chronic, with acute exacerbation, severe  DORIAN  Panic disorder     Nicotine dependence     Elevated LFTs     Alcohol abuse       PROBLEM LIST AND MANAGEMENT PLANS    Schizoaffective disorder, depressed type, chronic, with acute exacerbation, severe  - start zyprexa 5 mg PO qhs  - consider AD if needed  - follow up with outpatient mental health provider after discharge  - pt counseled     DORIAN  - start hydroxyzine 50 mg PO q 6 hours PRN  - consider AD if needed  - follow up with outpatient mental health provider after discharge  - pt counseled     Panic disorder  - start hydroxyzine 50 mg PO q 6 hours PRN  - consider AD if needed  - follow up with outpatient mental health provider after discharge  - pt counseled     Nicotine dependence  - start nicotine patch 14 mg PO qd PRN        Alcohol abuse  - SW consulted for assistance with additional resources   -  patient  - monitor for w/d        Discussed diagnosis, risks and benefits of proposed treatment vs alternative treatments vs no treatment, potential side effects of these treatments and the inherent unpredictability of treatment. The patient expresses understanding of the above and displays the capacity to agree with this treatment given said understanding. Patient also agrees that, currently, the benefits outweigh the risks and would like to pursue/continue treatment at this time.      DIAGNOSTIC TESTING  Labs reviewed with patient; LFT's obtained yesterday have decreased.     Disposition:  -Will attempt to obtain outside psychiatric records if available  -SW to assist with aftercare planning and collateral  -Once stable discharge home with outpatient follow up care and/or rehab  -Continue inpatient treatment under a PEC and/or CEC for danger to self/ danger to others/grave disability as evident by gravely disabled      Protective inpatient pyschiatric hospitalization required while a safe disposition plan is  enacted: Yes    Patient stabilized and ready for discharge from inpatient psychiatric unit: No        STAFF:   Ericka Soares MD-3  Psychiatry

## 2022-03-31 NOTE — PROGRESS NOTES
"   03/31/22 1040   [facility] Group Therapy   Group Name Therapeutic Recreation   Specific Interventions Coping Skills Training   Participation Level Appropriate   Participation Quality Requires Prompting   Insight/Motivation Improved   Affect/Mood Display Depressed   Cognition Alert   Psychomotor WNL   Patient presents depressed, guarded, holding his head down, reports an "alright" mood, states he was distracted and hearing voices. Patient reports he doesn't like being around people, listening to music or watching TV because a simple word will trigger the voices and the voices intensify. Patient reports he just work, go home, and drink alcohol lately to sleep. During group atient concentration was poor, directions repeated for understanding, needed reminding when it was his turn, quiet unless approached.  "

## 2022-03-31 NOTE — PLAN OF CARE
Patient guarded, quiet, and withdrawn; unkempt.  Denies intentions to harm self and/or others at this time.   Reports auditory hallucinations with negative voices.  Affect and emotion flat, restricted, and depressed.  Paucity of speech with impoverished thoughts; appears paranoid when addressed and/or approached.  Isolates in bed; minimal interactions with peers and staff.  Accepts meals and medications.  Unable to teach new information at this time.

## 2022-03-31 NOTE — PLAN OF CARE
Problem: Adult Behavioral Health Plan of Care  Goal: Optimized Coping Skills in Response to Life Stressors  Outcome: Ongoing, Progressing       Group Note:  Pt isolating in room, in bed and refusing to attend group. PLPC met with pt 1:1. Pt had covers over head and  not responding to each question asked. PLPC discussed the exercise of Group psychotherapy today. Group focused on attempting to identify what do they do in certain situations and how they feel. PLPC presented the Third Side Perspective. Part of the challenge of bringing third side is being able to see different perspectives of a situation. Much of the time we are clear about how we see the world but not so clear on how the others see the world or the situation.

## 2022-03-31 NOTE — PLAN OF CARE
Rested quietly with closed eyes and even resp for 8 hours, as pt remains now.  Modified VC and all precautions maintained.  Pathways clear and bed in low position.

## 2022-03-31 NOTE — NURSING
Patient's sister Desiree Cruz ([phone]) called to inform the patient that his girlfriend is pregnant and considering .    Counselor and nursing team discussed this information and after evaluating the patient's mental status the decision was made to inform the patient at a later time to call his girlfriend.    At this point, the patient has not called any family/friends to give them his patient ID #. The patient continues having auditory hallucinations with negative connotations.
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
## 2022-03-31 NOTE — PLAN OF CARE
"Isolating in room most of shift.  Flat affect.  Guarded.  States mood "good".  Soft spoken.  Accepted hs snack.  Declined something to help him sleep at 22:05.  Restless.  Denies suicidal/homicidal thoughts at this time.  Accepted hs medication.  Stressed compliance with meds upon discharge.    "

## 2022-03-31 NOTE — PLAN OF CARE
[facility] discussed with pt on collateral consent. Pt signed collateral consent for Merline eddy sister [phone]. [facility] attempted to contact Merline to discuss pt admission to the hospital. Merline stated:    Reason for admission- describe what happened?  He is just in his depressed mode and when he gets like that he does not say much or he does not eat. And when he is himself he does well. In the last two weeks he was not himself. My brother found that he was grabbing for something that was on the floor and there was not anything on the floor.   Prior treatment places and dates-doctor name and location  Reason for prior treatment- same or different  How long has patient had problem(s)?  He has been at [facility] last year and that is when we noted.    Substance abuse- what , how long, how much, how often?  My brother does not use any type of drugs  Legal Issues- Current charges, type of offense, probation or parole?  No legal issues with the law.   History of suicide attempts- when and what methods, did they require medical attention?  No history of suicide   Alcohol Use-  What preference of alcohol, how much, how long, how often?  Drinks occasionally like for weddings and things, but not on a daily bases.  History of violence-describe behaviors and triggers  No history of violence  Any guns or weapons in the home? If yes, recommend that these be secured.  No guns or weapons in his home.  I will make sure the sharp objects are secured.  What is patients baseline behavior/mood- how well do they know if patient is doing well?  When he is on his medications and the medications are working correctly.  What helps the patient stay well?  Internal/external coping strategies ( attending meetings, going to groups, taking medications, spending time with family ( etc)?  When he is on his medications  Discharge plan:    Where will the patient live upon discharge?  Pt will return to his home.  Who else in the home?  He lives  alone  Will someone be able to pick the patient up when discharged?   My brother Samy will come to pick him up upon discharge.

## 2022-03-31 NOTE — PROGRESS NOTES
"PSYCHIATRY DAILY INPATIENT PROGRESS NOTE  SUBSEQUENT HOSPITAL VISIT    ENCOUNTER DATE: 3/31/2022  [facility]    DATE OF ADMISSION: 3/29/2022  7:39 PM  LENGTH OF STAY: 2 days      CHIEF COMPLAINT   Jean Cruz is a 41 y.o. male, seen during daily vee rounds on the inpatient unit.  Jean Cruz presented with the chief complaint of depression and psychosis      The patient was seen and examined. The chart was reviewed.     Reviewed notes from CTRS, NP and LPC and labs from the last 24 hours.    The patient's case was discussed with the treatment team/care providers today including Rns, CTRS, Speciality services and LPC    Staff reports no behavioral or management issues.     The patient has been compliant with treatment.        Subjective 03/31/2022       Today the patient reports "I don't care to repeat it.... I feel like people can hear what I'm thinking."    Patient very distressed by AH, he was hesitant to disclose the content of his psychosis.    The patient denies any side effects to medications.        Interim/overnight events per report/notes:  Patient guarded, quiet, and withdrawn; unkempt.  Denies intentions to harm self and/or others at this time. Denies auditory and/or visual hallucinations.  Affect and emotion flat and depressed.  Paucity of speech with impoverished thoughts.  Isolates in room; minimal interactions with peers and staff.            Psychiatric ROS (observed, reported, or endorsed/denied):  Depressed mood - less  Interest/pleasure/anhedonia: Continuing  Guilt/hopelessness/worthlessness - less  Changes in Sleep - fluctuating  Changes in Appetite - less  Changes in Concentration - Continuing  Changes in Energy - less  PMA/R- less  Suicidal- active/passive ideations - denies  Homicidal ideations: active/passive ideations - denies    Hallucinations - Continuing  Delusions - Continuing  Disorganized behavior - None  Disorganized speech - None  Negative symptoms - " Continuing    Elevated mood - No  Decreased need for sleep - No  Grandiosity - No  Racing thoughts - endorses  Impulsivity - denies  Irritability- endorses  Increased energy - No  Distractibility - No  Increase in goal-directed activity or PMA- No    Symptoms of DORIAN - Continuing  Symptoms of Panic Disorder- less  Symptoms of PTSD - None        Overall progress: Patient is showing no improvement on the Unit to date        Psychotherapy:  · Target symptoms: substance abuse, psychosis  · Why chosen therapy is appropriate versus another modality: relevant to diagnosis  · Outcome monitoring methods: self-report  · Therapeutic intervention type: supportive psychotherapy  · Topics discussed/themes: building skills sets for symptom management, symptom recognition  · The patient's response to the intervention is accepting. The patient's progress toward treatment goals is fair.   · Duration of intervention: 17 minutes.        Medical ROS  Review of Systems   Constitutional: Negative for chills and fever.   HENT: Negative for hearing loss.    Eyes: Negative for blurred vision and double vision.   Respiratory: Negative for shortness of breath.    Cardiovascular: Negative for chest pain and palpitations.   Gastrointestinal: Negative for constipation, diarrhea, nausea and vomiting.   Genitourinary: Negative for dysuria.   Musculoskeletal: Negative for back pain and neck pain.   Skin: Negative for rash.         PAST MEDICAL HISTORY   Past Medical History:   Diagnosis Date    Anxiety     Depression     History of psychiatric hospitalization     Hx of psychiatric care     Psychiatric problem     Therapy            PSYCHOTROPIC MEDICATIONS   Scheduled Meds:   folic acid  1 mg Oral Daily    multivitamin  1 tablet Oral Daily    nicotine  1 patch Transdermal Daily    OLANZapine  5 mg Oral QHS    thiamine  100 mg Oral Daily     Continuous Infusions:  PRN Meds:.acetaminophen, aluminum-magnesium hydroxide-simethicone,  hydrOXYzine HCL, ibuprofen, magnesium hydroxide 400 mg/5 ml, OLANZapine **AND** OLANZapine        EXAMINATION    VITALS   Vitals:    03/29/22 1940 03/30/22 0719 03/30/22 2002 03/31/22 0729   BP: (!) 170/85 124/82 126/75 (!) 114/54   BP Location:  Left arm Right arm    Patient Position: Sitting Lying Sitting    Pulse: 87 90 85 83   Resp: 18 18 18 18   Temp: 97.8 °F (36.6 °C) 97.5 °F (36.4 °C) 98.1 °F (36.7 °C) 97.9 °F (36.6 °C)   TempSrc:  Temporal  Temporal   SpO2: 98%      Weight: 78.6 kg (173 lb 4.5 oz)      Height: 6' (1.829 m)          Body mass index is 23.5 kg/m².        CONSTITUTIONAL  General Appearance: unremarkable, age appropriate    MUSCULOSKELETAL  Muscle Strength and Tone:no tremor, no tic  Abnormal Involuntary Movements: No  Gait and Station: non-ataxic    PSYCHIATRIC   Level of Consciousness: awake and alert   Orientation: person, place and situation  Grooming: Casually dressed and Well groomed  Psychomotor Behavior: normal, cooperative  Speech: normal tone, normal rate, normal pitch, normal volume  Language: grossly intact  Mood: depressed  Affect: Constricted  Thought Process: linear, logical  Associations: intact   Thought Content: +delusions  Perceptions: +AH and denies  VH  Memory: Able to recall past events, Remote intact and Recent intact  Attention: Attends to interview without distraction  Fund of Knowledge: Aware of current events and Vocabulary appropriate   Estimate if Intelligence:  Average based on work/education history, vocabulary and mental status exam  Insight: has awareness of illness  Judgment: behavior is adequate to circumstances        DIAGNOSTIC TESTING   Laboratory Results  Recent Results (from the past 24 hour(s))   Comprehensive Metabolic Panel    Collection Time: 03/31/22  6:34 AM   Result Value Ref Range    Sodium 143 136 - 145 mmol/L    Potassium 4.8 3.5 - 5.1 mmol/L    Chloride 106 95 - 110 mmol/L    CO2 26 23 - 29 mmol/L    Glucose 101 70 - 110 mg/dL    BUN 19 6 - 20  mg/dL    Creatinine 1.2 0.5 - 1.4 mg/dL    Calcium 9.4 8.7 - 10.5 mg/dL    Total Protein 6.8 6.0 - 8.4 g/dL    Albumin 3.8 3.5 - 5.2 g/dL    Total Bilirubin 0.4 0.1 - 1.0 mg/dL    Alkaline Phosphatase 59 55 - 135 U/L    AST 66 (H) 10 - 40 U/L    ALT 63 (H) 10 - 44 U/L    Anion Gap 11 8 - 16 mmol/L    eGFR if African American >60 >60 mL/min/1.73 m^2    eGFR if non African American >60 >60 mL/min/1.73 m^2       ASSESSMENT         Schizoaffective disorder, depressed type, chronic, with acute exacerbation, severe  DORIAN  Panic disorder     Nicotine dependence     Elevated LFTs     Alcohol abuse           PROBLEM LIST AND MANAGEMENT PLANS        Schizoaffective disorder, depressed type, chronic, with acute exacerbation, severe  - started/continue zyprexa 5 mg PO qhs  -increase to 7.5 mg PO qhs tonight  - consider AD if needed  - follow up with outpatient mental health provider after discharge  - pt counseled     DORIAN  - started/continue hydroxyzine 50 mg PO q 6 hours PRN  - consider AD if needed  - follow up with outpatient mental health provider after discharge  - pt counseled     Panic disorder  - started/continue hydroxyzine 50 mg PO q 6 hours PRN  - consider AD if needed  - follow up with outpatient mental health provider after discharge  - pt counseled     Nicotine dependence  - started/continue nicotine patch 14 mg PO qd PRN        Elevated LFTs  - possibly alcohol related  - hep panel, in process  - monitor, improving     Alcohol abuse  - SW consulted for assistance with additional resources   -  patient  - monitor for w/d                Discussed diagnosis, risks and benefits of proposed treatment vs alternative treatments vs no treatment, potential side effects of these treatments and the inherent unpredictability of treatment. The patient expresses understanding of the above and displays the capacity to agree with this treatment given said understanding. Patient also agrees that, currently, the benefits  outweigh the risks and would like to pursue/continue treatment at this time.      DISCHARGE PLANNING  Expected Disposition Plan: Home or Self Care      NEED FOR CONTINUED HOSPITALIZATION  Psychiatric illness continues to pose a potential threat to life or bodily function, of self or others, thereby requiring the need for continued inpatient psychiatric hospitalization: Yes, due to: gravely disabled    Protective inpatient pyschiatric hospitalization required while a safe disposition plan is enacted: Yes    Patient stabilized and ready for discharge from inpatient psychiatric unit: No        STAFF:   Zaid Matson III, MD  Psychiatry

## 2022-04-01 PROCEDURE — 25000003 PHARM REV CODE 250: Performed by: STUDENT IN AN ORGANIZED HEALTH CARE EDUCATION/TRAINING PROGRAM

## 2022-04-01 PROCEDURE — 99233 PR SUBSEQUENT HOSPITAL CARE,LEVL III: ICD-10-PCS | Mod: ,,, | Performed by: STUDENT IN AN ORGANIZED HEALTH CARE EDUCATION/TRAINING PROGRAM

## 2022-04-01 PROCEDURE — 99233 SBSQ HOSP IP/OBS HIGH 50: CPT | Mod: ,,, | Performed by: STUDENT IN AN ORGANIZED HEALTH CARE EDUCATION/TRAINING PROGRAM

## 2022-04-01 PROCEDURE — 11400000 HC PSYCH PRIVATE ROOM

## 2022-04-01 RX ORDER — OLANZAPINE 10 MG/1
10 TABLET ORAL NIGHTLY
Status: DISCONTINUED | OUTPATIENT
Start: 2022-04-01 | End: 2022-04-03

## 2022-04-01 RX ADMIN — FOLIC ACID 1 MG: 1 TABLET ORAL at 08:04

## 2022-04-01 RX ADMIN — Medication 100 MG: at 08:04

## 2022-04-01 RX ADMIN — MAGNESIUM HYDROXIDE 2400 MG: 400 SUSPENSION ORAL at 05:04

## 2022-04-01 RX ADMIN — OLANZAPINE 10 MG: 10 TABLET, FILM COATED ORAL at 08:04

## 2022-04-01 RX ADMIN — THERA TABS 1 TABLET: TAB at 08:04

## 2022-04-01 NOTE — PLAN OF CARE
[facility] discussed with sister about admission to hospital. Pt sister Crissy Cruz stated:    Reason for admission- describe what happened?  He had not been sleeping and the only time he would sleep for about 30 minutes to an hour. He did nto sleep in 5 whole days and staring up at the wall or at the t.v. which was not even on. 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   Prior treatment places and dates-doctor name and location  Reason for prior treatment- same or different  How long has patient had problem(s)?   The last time he came it was for mental health, but this time he just could not sleep and he was using sleeping aids from over the counter which was 25 mg and it was not working.    Substance abuse- what , how long, how much, how often?  He does not use any drugs. He only smokes cigarettes.  Legal Issues- Current charges, type of offense, probation or parole?  No legal issues with the law.   History of suicide attempts- when and what methods, did they require medical attention?  No suicide attempts.  Alcohol Use-  What preference of alcohol, how much, how long, how often?  Pt drinks on special occassions like weddings and birthday parties.   History of violence-describe behaviors and triggers  No history of violent  Any guns or weapons in the home? If yes, recommend that these be secured.  No guns int he home  What is patients baseline behavior/mood- how well do they know if patient is doing well?  If he stays on his medications.  What helps the patient stay well?  Internal/external coping strategies ( attending meetings, going to groups, taking medications, spending time with family ( etc)?   By taking his medications  Discharge plan:    Where will the patient live upon discharge?  Pt will go back home to his place, but when he goes home he will come and live with me for awhile.  Who else in the home?  He lives alone.  Will someone be able to pick the patient up when discharged?   I will come and pick him up  upon discharge.

## 2022-04-01 NOTE — PROGRESS NOTES
"   04/01/22 1040   [facility] Group Therapy   Group Name Leisure Education   Specific Interventions Coping Skills Training  (identify healthy leisure activities and benefits)   Participation Level Minimal   Participation Quality Requires Prompting   Insight/Motivation Limited   Affect/Mood Display Depressed   Cognition Alert   Psychomotor WNL   Patient present withdrawn, depressed, isolative, reports and alright" mood, poor eye contact, holding his head down, distracted, blinking his eyes, reports he was still hearing voices. Patient reports he don't participate in much because of the voices.  "
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
## 2022-04-01 NOTE — NURSING
Patient withdrawn, isolative, & paranoid .  Thoughts linear,thought blocking, speech exhibits response lag.  Appears flat, poor eye contact, appropriate in appearance.  Endorses auditory hallucinations denies visual hallucinations. Denies SI/ HI & A/V hallucinations. Monitor q 15 minutes per protocol.

## 2022-04-01 NOTE — PLAN OF CARE
"  Problem: Adult Behavioral Health Plan of Care  Goal: Optimized Coping Skills in Response to Life Stressors  Outcome: Ongoing, Progressing         Group Note:  Pt did not attend group. PLPC met with pt individually. Pt requested to be excused from group today. Pt was resting in bed quietly and stated " I am tired and I am not feeling well". PLPC educated pt on the importance of attending group.  "

## 2022-04-01 NOTE — PLAN OF CARE
Plan of care reviewed. Denies intent to harm self or others at this time. Accepts snacks and medications. Gait steady, no falls. Limited interactions noted with staff and peers. Patient isolates in his room. Promoted individualized safety plan, reality-based interactions, effective coping strategies, and impulse control. Will continue precautions and monitor for safety.

## 2022-04-01 NOTE — PLAN OF CARE
Lying quietly in bed, eyes closed, respirations even, unlabored. Apparently asleep. Slept 6 hours thus far with 3 interruptions. Safety precautions maintained. Rounds done every 15 minutes. Bed is fixed in low position and room is uncluttered and pathways are clear.

## 2022-04-01 NOTE — PROGRESS NOTES
"PSYCHIATRY DAILY INPATIENT PROGRESS NOTE  SUBSEQUENT HOSPITAL VISIT    ENCOUNTER DATE: 4/1/2022  [facility]    DATE OF ADMISSION: 3/29/2022  7:39 PM  LENGTH OF STAY: 3 days      CHIEF COMPLAINT   Jean Cruz is a 41 y.o. male, seen during daily vee rounds on the inpatient unit.  Jean Cruz presented with the chief complaint of depression and psychosis      The patient was seen and examined. The chart was reviewed.     Reviewed notes from Rns, CTRS and LPC and labs from the last 24 hours.    The patient's case was discussed with the treatment team/care providers today including Rns    Staff reports no behavioral or management issues.     The patient has been compliant with treatment.        Subjective 04/01/2022    Today the patient reports continued AH but does not want to discuss the content, "it's the same."    He is isolative in the milieu.    The patient denies any side effects to medications.      Interim/overnight events per report/notes:  Patient guarded, quiet, and withdrawn; unkempt.  Denies intentions to harm self and/or others at this time.   Reports auditory hallucinations with negative voices.  Affect and emotion flat, restricted, and depressed.  Paucity of speech with impoverished thoughts; appears paranoid when addressed and/or approached.    Patient presents depressed, guarded, holding his head down, reports an "alright" mood, states he was distracted and hearing voices.         Psychiatric ROS (observed, reported, or endorsed/denied):  Depressed mood - less  Interest/pleasure/anhedonia: Continuing  Guilt/hopelessness/worthlessness - less  Changes in Sleep - fluctuating  Changes in Appetite - less  Changes in Concentration - Continuing  Changes in Energy - less  PMA/R- less  Suicidal- active/passive ideations - denies  Homicidal ideations: active/passive ideations - denies    Hallucinations - Continuing, derogatory   Delusions - Continuing of TB  Disorganized behavior - " None  Disorganized speech - None  Negative symptoms - Continuing    Elevated mood - No  Decreased need for sleep - No  Grandiosity - No  Racing thoughts - endorses  Impulsivity - denies  Irritability- endorses  Increased energy - No  Distractibility - No  Increase in goal-directed activity or PMA- No    Symptoms of DORIAN - Continuing  Symptoms of Panic Disorder- less  Symptoms of PTSD - None        Overall progress: Patient is showing mild improvement           Medical ROS  Review of Systems   Constitutional: Negative for chills and fever.   HENT: Negative for hearing loss.    Eyes: Negative for blurred vision and double vision.   Respiratory: Negative for shortness of breath.    Cardiovascular: Negative for chest pain and palpitations.   Gastrointestinal: Negative for constipation, diarrhea, nausea and vomiting.   Genitourinary: Negative for dysuria.   Musculoskeletal: Negative for back pain and neck pain.   Skin: Negative for rash.         PAST MEDICAL HISTORY   Past Medical History:   Diagnosis Date    Anxiety     Depression     History of psychiatric hospitalization     Hx of psychiatric care     Psychiatric problem     Therapy            PSYCHOTROPIC MEDICATIONS   Scheduled Meds:   folic acid  1 mg Oral Daily    multivitamin  1 tablet Oral Daily    nicotine  1 patch Transdermal Daily    OLANZapine  7.5 mg Oral QHS    thiamine  100 mg Oral Daily     Continuous Infusions:  PRN Meds:.acetaminophen, aluminum-magnesium hydroxide-simethicone, hydrOXYzine HCL, ibuprofen, magnesium hydroxide 400 mg/5 ml, OLANZapine **AND** OLANZapine        EXAMINATION    VITALS   Vitals:    03/30/22 2002 03/31/22 0729 03/31/22 2000 04/01/22 0800   BP: 126/75 (!) 114/54 119/63 (!) 120/55   BP Location: Right arm  Left arm Right arm   Patient Position: Sitting  Sitting Sitting   Pulse: 85 83 94 81   Resp: 18 18 20 18   Temp: 98.1 °F (36.7 °C) 97.9 °F (36.6 °C) 97.7 °F (36.5 °C) 97.2 °F (36.2 °C)   TempSrc:  Temporal Temporal  "Temporal   SpO2:       Weight:       Height:           Body mass index is 23.5 kg/m².        CONSTITUTIONAL  General Appearance: unremarkable, age appropriate    MUSCULOSKELETAL  Muscle Strength and Tone:no tremor, no tic  Abnormal Involuntary Movements: No  Gait and Station: non-ataxic    PSYCHIATRIC   Level of Consciousness: awake and alert   Orientation: person, place and situation  Grooming: Casually dressed and Well groomed  Psychomotor Behavior: normal, cooperative  Speech: slowed, increased latency of response, non-spontaneous, monotone  Language: grossly intact  Mood: "okay"  Affect: less Constricted  Thought Process: linear, logical  Associations: intact   Thought Content: +delusions  Perceptions: +AH and denies  VH  Memory: Able to recall past events, Remote intact and Recent intact  Attention: Attends to interview without distraction  Fund of Knowledge: Aware of current events and Vocabulary appropriate   Estimate if Intelligence:  Average based on work/education history, vocabulary and mental status exam  Insight: has awareness of illness  Judgment: behavior is adequate to circumstances        DIAGNOSTIC TESTING   Laboratory Results  No results found for this or any previous visit (from the past 24 hour(s)).        ASSESSMENT         Schizoaffective disorder, depressed type, chronic, with acute exacerbation, severe  DORIAN  Panic disorder     Nicotine dependence     Elevated LFTs     Alcohol abuse             PROBLEM LIST AND MANAGEMENT PLANS        Schizoaffective disorder, depressed type, chronic, with acute exacerbation, severe  - started/continue zyprexa 5 mg PO qhs  -increase to 7.5 mg PO qhs -increase to 10 mg PO qhs  - consider AD if needed  - follow up with outpatient mental health provider after discharge  - pt counseled     DORIAN  - started/continue hydroxyzine 50 mg PO q 6 hours PRN  - consider AD if needed  - follow up with outpatient mental health provider after discharge  - pt " counseled     Panic disorder  - started/continue hydroxyzine 50 mg PO q 6 hours PRN  - consider AD if needed  - follow up with outpatient mental health provider after discharge  - pt counseled     Nicotine dependence  - started/continue nicotine patch 14 mg PO qd PRN        Elevated LFTs  - possibly alcohol related  - hep panel, in process  - monitor, improving       Alcohol abuse  - SW consulted for assistance with additional resources   -  patient  - monitor for w/d                Discussed diagnosis, risks and benefits of proposed treatment vs alternative treatments vs no treatment, potential side effects of these treatments and the inherent unpredictability of treatment. The patient expresses understanding of the above and displays the capacity to agree with this treatment given said understanding. Patient also agrees that, currently, the benefits outweigh the risks and would like to pursue/continue treatment at this time.      DISCHARGE PLANNING  Expected Disposition Plan: Home or Self Care      NEED FOR CONTINUED HOSPITALIZATION  Psychiatric illness continues to pose a potential threat to life or bodily function, of self or others, thereby requiring the need for continued inpatient psychiatric hospitalization: Yes, due to: gravely disabled    Protective inpatient pyschiatric hospitalization required while a safe disposition plan is enacted: Yes    Patient stabilized and ready for discharge from inpatient psychiatric unit: No        STAFF:   Zaid Matson III, MD  Psychiatry

## 2022-04-02 PROCEDURE — 11400000 HC PSYCH PRIVATE ROOM

## 2022-04-02 PROCEDURE — 99233 SBSQ HOSP IP/OBS HIGH 50: CPT | Mod: ,,, | Performed by: PSYCHIATRY & NEUROLOGY

## 2022-04-02 PROCEDURE — 25000003 PHARM REV CODE 250: Performed by: STUDENT IN AN ORGANIZED HEALTH CARE EDUCATION/TRAINING PROGRAM

## 2022-04-02 PROCEDURE — 99233 PR SUBSEQUENT HOSPITAL CARE,LEVL III: ICD-10-PCS | Mod: ,,, | Performed by: PSYCHIATRY & NEUROLOGY

## 2022-04-02 RX ADMIN — OLANZAPINE 10 MG: 10 TABLET, FILM COATED ORAL at 08:04

## 2022-04-02 RX ADMIN — ALUMINUM HYDROXIDE, MAGNESIUM HYDROXIDE, AND SIMETHICONE 30 ML: 200; 200; 20 SUSPENSION ORAL at 09:04

## 2022-04-02 RX ADMIN — HYDROXYZINE HYDROCHLORIDE 50 MG: 25 TABLET, FILM COATED ORAL at 08:04

## 2022-04-02 RX ADMIN — Medication 100 MG: at 09:04

## 2022-04-02 RX ADMIN — FOLIC ACID 1 MG: 1 TABLET ORAL at 09:04

## 2022-04-02 RX ADMIN — THERA TABS 1 TABLET: TAB at 09:04
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
## 2022-04-02 NOTE — PROGRESS NOTES
"PSYCHIATRY DAILY INPATIENT PROGRESS NOTE  SUBSEQUENT HOSPITAL VISIT     ENCOUNTER DATE: 4/2/2022  SITE: [facility]     DATE OF ADMISSION: 3/29/2022  7:39 PM  LENGTH OF STAY: 4 days       CHIEF COMPLAINT   Jean Cruz is a 41 y.o. male, seen during daily vee rounds on the inpatient unit.  Jean Cruz presented with the chief complaint of depression and psychosis        The patient was seen and examined. The chart was reviewed.      Reviewed notes from Rns, CTRS and LPC and labs from the last 24 hours.     The patient's case was discussed with the treatment team/care providers today including Rns     Staff reports no behavioral or management issues.      The patient has been compliant with treatment.      Subjective 04/02/2022     Today the patient reports continued AH but less, less frequent/lower volume. He is guarded and subtle paranoia. States sleeping in fragmented, eating well. States that he is always quiet and to himself, but since AH start several years ago he has been more withdrawn.      He is isolative in the milieu.     The patient denies any side effects to medications.      Interim/overnight events per report/notes:   Pt sleeping at this time, slept 7.5 hrs with 2 awakenings. NAD. Resp even and unlabored.Pathways clear,bed in low position. Q 15 min safety check ongoing.All precautions maintained.   Pt did not attend group. PLPC met with pt individually. Pt requested to be excused from group today. Pt was resting in bed quietly and stated " I am tired and I am not feeling well". PLPC educated pt on the importance of attending group     Psychiatric ROS (observed, reported, or endorsed/denied):  Depressed mood - less  Interest/pleasure/anhedonia: Continuing  Guilt/hopelessness/worthlessness - less  Changes in Sleep - fluctuating  Changes in Appetite - less  Changes in Concentration - Continuing  Changes in Energy - less  PMA/R- less  Suicidal- active/passive ideations - denies  Homicidal " ideations: active/passive ideations - denies     Hallucinations - less  Delusions - denies, paranoia  Disorganized behavior - None  Disorganized speech - None  Negative symptoms - Continuing     Elevated mood - No  Decreased need for sleep - No  Grandiosity - No  Racing thoughts - endorses  Impulsivity - denies  Irritability- endorses  Increased energy - No  Distractibility - No  Increase in goal-directed activity or PMA- No     Symptoms of DORIAN - Continuing  Symptoms of Panic Disorder- less  Symptoms of PTSD - None       Overall progress: Patient is showing mild improvement      Medical ROS  Review of Systems   Constitutional: Negative for chills and fever.   HENT: Negative for hearing loss.    Eyes: Negative for blurred vision and double vision.   Respiratory: Negative for shortness of breath.    Cardiovascular: Negative for chest pain and palpitations.   Gastrointestinal: Negative for constipation, diarrhea, nausea and vomiting.   Genitourinary: Negative for dysuria.   Musculoskeletal: Negative for back pain and neck pain.   Skin: Negative for rash.            PAST MEDICAL HISTORY        Past Medical History:   Diagnosis Date    Anxiety      Depression      History of psychiatric hospitalization      Hx of psychiatric care      Psychiatric problem      Therapy                 PSYCHOTROPIC MEDICATIONS   Scheduled Meds:   folic acid  1 mg Oral Daily    multivitamin  1 tablet Oral Daily    nicotine  1 patch Transdermal Daily    OLANZapine  7.5 mg Oral QHS    thiamine  100 mg Oral Daily      Continuous Infusions:  PRN Meds:.acetaminophen, aluminum-magnesium hydroxide-simethicone, hydrOXYzine HCL, ibuprofen, magnesium hydroxide 400 mg/5 ml, OLANZapine **AND** OLANZapine           EXAMINATION     VITALS   Vitals:    04/02/22 0800   BP: (!) 103/57   Pulse: 76   Resp: 18   Temp: 97.9 °F (36.6 °C)       Vitals          Vitals:     03/30/22 2002 03/31/22 0729 03/31/22 2000 04/01/22 0800   BP: 126/75 (!) 114/54  "119/63 (!) 120/55   BP Location: Right arm   Left arm Right arm   Patient Position: Sitting   Sitting Sitting   Pulse: 85 83 94 81   Resp: 18 18 20 18   Temp: 98.1 °F (36.7 °C) 97.9 °F (36.6 °C) 97.7 °F (36.5 °C) 97.2 °F (36.2 °C)   TempSrc:   Temporal Temporal Temporal   SpO2:           Weight:           Height:                    Body mass index is 23.5 kg/m².           CONSTITUTIONAL  General Appearance: unremarkable, age appropriate     MUSCULOSKELETAL  Muscle Strength and Tone:no tremor, no tic  Abnormal Involuntary Movements: No  Gait and Station: non-ataxic     PSYCHIATRIC   Level of Consciousness: awake and alert   Orientation: person, place and situation  Grooming: Casually dressed and Well groomed  Psychomotor Behavior: normal, cooperative-guarded  Speech: slowed, increased latency of response, non-spontaneous, monotone  Language: grossly intact  Mood: "okay"  Affect: less Constricted  Thought Process: linear, logical  Associations: intact   Thought Content: no delusions endorsed  Perceptions: +AH and denies  VH  Memory: Able to recall past events, Remote intact and Recent intact  Attention: Attends to interview without distraction  Fund of Knowledge: Aware of current events and Vocabulary appropriate   Estimate if Intelligence:  Average based on work/education history, vocabulary and mental status exam  Insight: has awareness of illness  Judgment: behavior is adequate to circumstances           DIAGNOSTIC TESTING   Laboratory Results  Recent Results   No results found for this or any previous visit (from the past 24 hour(s)).              ASSESSMENT         Schizoaffective disorder, depressed type, chronic, with acute exacerbation, severe  DORIAN  Panic disorder     Nicotine dependence     Elevated LFTs     Alcohol abuse              PROBLEM LIST AND MANAGEMENT PLANS        Schizoaffective disorder, depressed type, chronic, with acute exacerbation, severe  - started/continue zyprexa 5 mg PO qhs  -increase to " 7.5 mg PO qhs -increase to 10 mg PO qhs  - consider AD if needed  - follow up with outpatient mental health provider after discharge  - pt counseled     DORIAN  - started/continue hydroxyzine 50 mg PO q 6 hours PRN  - consider AD if needed  - follow up with outpatient mental health provider after discharge  - pt counseled     Panic disorder  - started/continue hydroxyzine 50 mg PO q 6 hours PRN  - consider AD if needed  - follow up with outpatient mental health provider after discharge  - pt counseled     Nicotine dependence  - started/continue nicotine patch 14 mg PO qd PRN        Elevated LFTs  - possibly alcohol related  - hep panel, in process  - monitor, improving        Alcohol abuse  - SW consulted for assistance with additional resources   -  patient  - monitor for w/d                   Discussed diagnosis, risks and benefits of proposed treatment vs alternative treatments vs no treatment, potential side effects of these treatments and the inherent unpredictability of treatment. The patient expresses understanding of the above and displays the capacity to agree with this treatment given said understanding. Patient also agrees that, currently, the benefits outweigh the risks and would like to pursue/continue treatment at this time.        DISCHARGE PLANNING  Expected Disposition Plan: Home or Self Care        NEED FOR CONTINUED HOSPITALIZATION  Psychiatric illness continues to pose a potential threat to life or bodily function, of self or others, thereby requiring the need for continued inpatient psychiatric hospitalization: Yes, due to: gravely disabled     Protective inpatient pyschiatric hospitalization required while a safe disposition plan is enacted: Yes     Patient stabilized and ready for discharge from inpatient psychiatric unit: No           STAFF:   Isabel Bagley MD  Psychiatry

## 2022-04-02 NOTE — NURSING
Pt sleeping at this time, slept 7.5 hrs with 2 awakenings. NAD. Resp even and unlabored.Pathways clear,bed in low position. Q 15 min safety check ongoing.All precautions maintained.

## 2022-04-02 NOTE — NURSING
Patient withdrawn, isolative, & paranoid Thoughts linear,thought blocking, speech exhibits response lag, paucity.  Appears flat, poor eye contact, appropriate in appearance.  Endorses auditory hallucinations denies visual hallucinations, denies SI / HI.  Monitor q 15 minutes per protocol

## 2022-04-02 NOTE — PLAN OF CARE
Following POC.  Makes needs known.  Denies SI/HI,AH/VH.  Denies pain.  States he had a good day.  On the phone much of the evening.  Minimal interactions with peers.  Appetite is good.  Medication complaint.  Encouraged out of room activities.  Free of fall.

## 2022-04-03 PROCEDURE — 99233 SBSQ HOSP IP/OBS HIGH 50: CPT | Mod: ,,, | Performed by: PSYCHIATRY & NEUROLOGY

## 2022-04-03 PROCEDURE — 99233 PR SUBSEQUENT HOSPITAL CARE,LEVL III: ICD-10-PCS | Mod: ,,, | Performed by: PSYCHIATRY & NEUROLOGY

## 2022-04-03 PROCEDURE — 25000003 PHARM REV CODE 250: Performed by: STUDENT IN AN ORGANIZED HEALTH CARE EDUCATION/TRAINING PROGRAM

## 2022-04-03 PROCEDURE — 25000003 PHARM REV CODE 250: Performed by: PSYCHIATRY & NEUROLOGY

## 2022-04-03 PROCEDURE — 11400000 HC PSYCH PRIVATE ROOM

## 2022-04-03 RX ORDER — OLANZAPINE 5 MG/1
5 TABLET ORAL DAILY
Status: DISCONTINUED | OUTPATIENT
Start: 2022-04-03 | End: 2022-04-05 | Stop reason: HOSPADM

## 2022-04-03 RX ADMIN — Medication 100 MG: at 08:04

## 2022-04-03 RX ADMIN — OLANZAPINE 5 MG: 5 TABLET, FILM COATED ORAL at 01:04

## 2022-04-03 RX ADMIN — THERA TABS 1 TABLET: TAB at 08:04

## 2022-04-03 RX ADMIN — OLANZAPINE 15 MG: 5 TABLET, FILM COATED ORAL at 08:04

## 2022-04-03 RX ADMIN — FOLIC ACID 1 MG: 1 TABLET ORAL at 08:04

## 2022-04-03 RX ADMIN — HYDROXYZINE HYDROCHLORIDE 50 MG: 25 TABLET, FILM COATED ORAL at 08:04

## 2022-04-03 NOTE — PLAN OF CARE
Pt taking scheduled medications w/out difficulty;  Seizure /  Fall  prevention in place;  MVC Monitored per policy. Denies SI / HI; No C/O pain  No sleep disturbances as reported by PM shift. Upon morning assessment patient was     awake     calm      quiet      talkative      accepting of care     disagreed with care  Denies A/VH. No RIS observed.              Contracted for safety.   Avoids social contact,  No Interaction with peers noted.  (Mood/Behavior/Emotion): flat  hypoactive  sad    withdrawn calm cooperative  guarded labile depressed  disorganized thoughts  Ate meals/snack.     Continue POC.

## 2022-04-03 NOTE — PLAN OF CARE
Following POC.  Makes needs known.  Denies SI/HI,AH/VH.  Denies pain.  Rates depression  3/10.  Polite and cooperative.  Sitting on bed states he had a good day, that he is thinking.  Out of room for snacks, phone meds.  Minimal interactions with peers.  Medication compliant.  Appetite is good.  Encouraged out of room activities.  Encouraged group attendance.  Free of fall.

## 2022-04-03 NOTE — PROGRESS NOTES
"PSYCHIATRY DAILY INPATIENT PROGRESS NOTE  SUBSEQUENT HOSPITAL VISIT     ENCOUNTER DATE: 4/3/2022  [facility]     DATE OF ADMISSION: 3/29/2022  7:39 PM  LENGTH OF STAY: 5 days       CHIEF COMPLAINT   Jean Cruz is a 41 y.o. male, seen during daily vee rounds on the inpatient unit.  Jean Cruz presented with the chief complaint of depression and psychosis        The patient was seen and examined. The chart was reviewed.      Reviewed notes from Rns, CTRS and LPC and labs from the last 24 hours.     The patient's case was discussed with the treatment team/care providers today including Rns     Staff reports no behavioral or management issues.      The patient has been compliant with treatment.      Subjective 04/03/2022     Today the patient reports continued AH, but notes more distracting and impairing is his feelings that others can read his thoughts, "makes me paranoid and not want to be around people." Anxious. Some reality orientation but states it is overwhelming to feel that others are reading his thoughts, wishes he could socialize. Feels he only gets relief when alone sometimes or when sleeping. He still has sleep fragmentation and appetite is stable.   He is isolative in the milieu.     The patient denies any side effects to medications.      Interim/overnight events per report/notes:  Pt sleeping at this time, slept 7.5 hrs with no awakenings. NAD. Resp even and unlabored.Pathways clear,bed in low position. Q 15 min safety check ongoing.All precautions maintained  Patient withdrawn, isolative, & paranoid Thoughts linear,thought blocking, speech exhibits response lag, paucity.  Appears flat, poor eye contact, appropriate in appearance.  Endorses auditory hallucinations denies visual hallucinations, denies SI / HI.  Monitor q 15 minutes per protocol     Psychiatric ROS (observed, reported, or endorsed/denied):  Depressed mood - less  Interest/pleasure/anhedonia: " Continuing  Guilt/hopelessness/worthlessness - less  Changes in Sleep - fluctuating  Changes in Appetite - denies  Changes in Concentration - Continuing  Changes in Energy - less  PMA/R- less  Suicidal- active/passive ideations - denies  Homicidal ideations: active/passive ideations - denies     Hallucinations - present  Delusions - paranoia-thought broadcasting   Disorganized behavior - None  Disorganized speech - None  Negative symptoms - Continuing     Elevated mood - No  Decreased need for sleep - No  Grandiosity - No  Racing thoughts - endorses  Impulsivity - denies  Irritability- endorses  Increased energy - No  Distractibility - No  Increase in goal-directed activity or PMA- No     Symptoms of DORIAN - Continuing  Symptoms of Panic Disorder- less  Symptoms of PTSD - None       Overall progress: Patient is showing mild improvement      Medical ROS  Review of Systems   Constitutional: Negative for chills and fever.   HENT: Negative for hearing loss.    Eyes: Negative for blurred vision and double vision.   Respiratory: Negative for shortness of breath.    Cardiovascular: Negative for chest pain and palpitations.   Gastrointestinal: Negative for constipation, diarrhea, nausea and vomiting.   Genitourinary: Negative for dysuria.   Musculoskeletal: Negative for back pain and neck pain.   Skin: Negative for rash.            PAST MEDICAL HISTORY        Past Medical History:   Diagnosis Date    Anxiety      Depression      History of psychiatric hospitalization      Hx of psychiatric care      Psychiatric problem      Therapy                 PSYCHOTROPIC MEDICATIONS   Scheduled Meds:   folic acid  1 mg Oral Daily    multivitamin  1 tablet Oral Daily    nicotine  1 patch Transdermal Daily    OLANZapine  7.5 mg Oral QHS    thiamine  100 mg Oral Daily      Continuous Infusions:  PRN Meds:.acetaminophen, aluminum-magnesium hydroxide-simethicone, hydrOXYzine HCL, ibuprofen, magnesium hydroxide 400 mg/5 ml,  "OLANZapine **AND** OLANZapine           EXAMINATION     VITALS   Vitals:    04/03/22 0800   BP: (!) 109/54   Pulse: 71   Resp: 16   Temp: 98.3 °F (36.8 °C)       Vitals          Vitals:     03/30/22 2002 03/31/22 0729 03/31/22 2000 04/01/22 0800   BP: 126/75 (!) 114/54 119/63 (!) 120/55   BP Location: Right arm   Left arm Right arm   Patient Position: Sitting   Sitting Sitting   Pulse: 85 83 94 81   Resp: 18 18 20 18   Temp: 98.1 °F (36.7 °C) 97.9 °F (36.6 °C) 97.7 °F (36.5 °C) 97.2 °F (36.2 °C)   TempSrc:   Temporal Temporal Temporal   SpO2:           Weight:           Height:                    Body mass index is 23.5 kg/m².           CONSTITUTIONAL  General Appearance: unremarkable, age appropriate     MUSCULOSKELETAL  Muscle Strength and Tone:no tremor, no tic  Abnormal Involuntary Movements: No  Gait and Station: non-ataxic     PSYCHIATRIC   Level of Consciousness: awake and alert   Orientation: person, place and situation  Grooming: Casually dressed and Well groomed  Psychomotor Behavior: normal, cooperative-guarded  Speech: slowed, increased latency of response, non-spontaneous, monotone  Language: grossly intact  Mood: "not good"  Affect: blunted, anxious  Thought Process: linear, logical  Associations: intact   Thought Content: delusions that others can read his mind endorsed  Perceptions: +AH and denies  VH  Memory: Able to recall past events, Remote intact and Recent intact  Attention: Attends to interview without distraction  Fund of Knowledge: Aware of current events and Vocabulary appropriate   Estimate if Intelligence:  Average based on work/education history, vocabulary and mental status exam  Insight: has awareness of illness  Judgment: behavior is adequate to circumstances           DIAGNOSTIC TESTING   Laboratory Results  Recent Results   No results found for this or any previous visit (from the past 24 hour(s)).              ASSESSMENT         Schizoaffective disorder, depressed type, chronic, " with acute exacerbation, severe  DORIAN  Panic disorder     Nicotine dependence     Elevated LFTs     Alcohol abuse        PROBLEM LIST AND MANAGEMENT PLANS        Schizoaffective disorder, depressed type, chronic, with acute exacerbation, severe  - started/continue zyprexa 5 mg PO qhs  -increase to 7.5 mg PO qhs -increase to 10 mg PO qhs -increase to 15mg PO qhs and start 5mg PO daily. Plan to dc daily dose once qhs dose is at steady state/effective  - consider AD if needed  - follow up with outpatient mental health provider after discharge  - pt counseled     DORIAN  - started/continue hydroxyzine 50 mg PO q 6 hours PRN  - consider AD if needed  - follow up with outpatient mental health provider after discharge  - pt counseled     Panic disorder  - started/continue hydroxyzine 50 mg PO q 6 hours PRN  - consider AD if needed  - follow up with outpatient mental health provider after discharge  - pt counseled     Nicotine dependence  - started/continue nicotine patch 14 mg PO qd PRN     Elevated LFTs  - possibly alcohol related  - hep panel, in process  - monitor, improving     Alcohol abuse  - SW consulted for assistance with additional resources   -  patient  - monitor for w/d     Discussed diagnosis, risks and benefits of proposed treatment vs alternative treatments vs no treatment, potential side effects of these treatments and the inherent unpredictability of treatment. The patient expresses understanding of the above and displays the capacity to agree with this treatment given said understanding. Patient also agrees that, currently, the benefits outweigh the risks and would like to pursue/continue treatment at this time.        DISCHARGE PLANNING  Expected Disposition Plan: Home or Self Care        NEED FOR CONTINUED HOSPITALIZATION  Psychiatric illness continues to pose a potential threat to life or bodily function, of self or others, thereby requiring the need for continued inpatient psychiatric  hospitalization: Yes, due to: gravely disabled     Protective inpatient pyschiatric hospitalization required while a safe disposition plan is enacted: Yes     Patient stabilized and ready for discharge from inpatient psychiatric unit: No           STAFF:   Isabel Bagley MD  Psychiatry

## 2022-04-03 NOTE — NURSING
Patient withdrawn, isolative,guarded, & paranoid .  Thoughts linear,thought blocking, speech exhibits response lag.  Appears flat, poor eye contact, appropriate in appearance, preoccupied.   Denies A/V hallucinations . Monitor q 15 minutes per protocol

## 2022-04-04 PROCEDURE — 11400000 HC PSYCH PRIVATE ROOM

## 2022-04-04 PROCEDURE — 90833 PR PSYCHOTHERAPY W/PATIENT W/E&M, 30 MIN (ADD ON): ICD-10-PCS | Mod: ,,, | Performed by: PSYCHIATRY & NEUROLOGY

## 2022-04-04 PROCEDURE — 25000003 PHARM REV CODE 250: Performed by: STUDENT IN AN ORGANIZED HEALTH CARE EDUCATION/TRAINING PROGRAM

## 2022-04-04 PROCEDURE — 90833 PSYTX W PT W E/M 30 MIN: CPT | Mod: ,,, | Performed by: PSYCHIATRY & NEUROLOGY

## 2022-04-04 PROCEDURE — 99233 SBSQ HOSP IP/OBS HIGH 50: CPT | Mod: ,,, | Performed by: PSYCHIATRY & NEUROLOGY

## 2022-04-04 PROCEDURE — 99233 PR SUBSEQUENT HOSPITAL CARE,LEVL III: ICD-10-PCS | Mod: ,,, | Performed by: PSYCHIATRY & NEUROLOGY

## 2022-04-04 PROCEDURE — 25000003 PHARM REV CODE 250: Performed by: PSYCHIATRY & NEUROLOGY

## 2022-04-04 RX ADMIN — THERA TABS 1 TABLET: TAB at 08:04

## 2022-04-04 RX ADMIN — OLANZAPINE 15 MG: 5 TABLET, FILM COATED ORAL at 08:04

## 2022-04-04 RX ADMIN — Medication 100 MG: at 08:04

## 2022-04-04 RX ADMIN — OLANZAPINE 5 MG: 5 TABLET, FILM COATED ORAL at 08:04

## 2022-04-04 RX ADMIN — FOLIC ACID 1 MG: 1 TABLET ORAL at 08:04

## 2022-04-04 NOTE — NURSING
Pt sleeping at this time, slept 7.5 hrs with 1 awakening NAD. Resp even and unlabored.Pathways clear,bed in low position. Q 15 min safety check ongoing.All precautions maintained.

## 2022-04-04 NOTE — PLAN OF CARE
Following POC.  Makes needs known.  Denies SI/HI,AH/VH.  Denies pain.  States mood is good.  States he had a good day.  On the phone part of the evening.  Out on the unit much of the evening.  Interacts with peers.  Medication complaint.  Appetite is good.  Encouraged medication compliance out patient.  Encouraged group attendance.  Free of fall.

## 2022-04-04 NOTE — PROGRESS NOTES
04/04/22 1045   [facility] [place] Group Therapy   Group Name Other  (what do I want to change and benefits)   Specific Interventions Skilled Activity Self-Expression   Participation Level Appropriate;Sharing   Participation Quality Cooperative   Insight/Motivation Improved   Affect/Mood Display Appropriate   Cognition Alert   Psychomotor WNL   Patient shared he want to stop negative thinking because that's what landed him here, socialize with others, open up a little more and pay his bills on time so he doesn't feel behind.

## 2022-04-04 NOTE — PLAN OF CARE
Patient guarded, quiet, and withdrawn.  Denies intentions to harm self and/or others at this time. Denies auditory and/or visual hallucinations.  Mood consistent with blunted affect.  Continues to isolate in bed; minimal appropriate interactions with select staff-patient is more vocal when addressed by staff.  Accepts meals and medications.  Discussed medication and plan of care; patient will benefit from continued education and reinforcement.

## 2022-04-04 NOTE — PROGRESS NOTES
PSYCHIATRY DAILY INPATIENT PROGRESS NOTE  SUBSEQUENT HOSPITAL VISIT    ENCOUNTER DATE: 4/4/2022  SITE: [facility]    DATE OF ADMISSION: 3/29/2022  7:39 PM  LENGTH OF STAY: 6 days      CHIEF COMPLAINT   Jean Cruz is a 41 y.o. male, seen during daily vee rounds on the inpatient unit.  Jean Cruz presented with the chief complaint of depression and psychosis      The patient was seen and examined. The chart was reviewed.     Reviewed notes from Rns and MD and labs from the last 24 hours.    The patient's case was discussed with the treatment team/care providers today including Rns    Staff reports no behavioral or management issues.     The patient has been compliant with treatment.        Subjective 04/04/2022    Today the patient reports that he had a good weekend. He notes lessening symptoms pf psychosis. He feels that the medications are helping.    He feels that all symptoms are improving as documented below. He has his own trailer. He plans to follow up at the local [facility]. He wants to get back to work.      The patient denies any side effects to medications.          Psychiatric ROS (observed, reported, or endorsed/denied):  Depressed mood - decreasing steadily  Interest/pleasure/anhedonia: decreasing steadily  Guilt/hopelessness/worthlessness - decreasing steadily  Changes in Sleep - decreasing steadily  Changes in Appetite - decreasing steadily  Changes in Concentration - decreasing steadily  Changes in Energy - decreasing steadily  PMA/R- decreasing steadily  Suicidal- active/passive ideations - denies  Homicidal ideations: active/passive ideations - denies    Hallucinations - decreasing steadily   Delusions - decreasing steadily   Disorganized behavior - None  Disorganized speech - None  Negative symptoms - decreasing steadily    Elevated mood - No  Decreased need for sleep - No  Grandiosity - No  Racing thoughts - decreasing steadily  Impulsivity - denies  Irritability- decreasing  steadily  Increased energy - No  Distractibility - No  Increase in goal-directed activity or PMA- No    Symptoms of DORIAN - decreasing steadily  Symptoms of Panic Disorder- less  Symptoms of PTSD - None        Overall progress: Patient is showing mild improvement     PSYCHOTHERAPY ADD-ON +[id number]   16-37 minutes    Time: 16 minutes  Participants: Met with patient    Therapeutic Intervention Type: insight oriented psychotherapy, behavior modifying psychotherapy, supportive psychotherapy, interactive psychotherapy  Why chosen therapy is appropriate versus another modality: relevant to diagnosis, patient responds to this modality, evidence based practice    Target symptoms: psychosis  Primary focus: psychosis, depression  Psychotherapeutic techniques: supportive and psycho-educational techniques    Outcome monitoring methods: self-report, observation    Patient's response to intervention:  The patient's response to intervention is accepting.    Progress toward goals:  The patient's progress toward goals is fair .          Medical ROS  Review of Systems   Constitutional: Negative for chills and fever.   HENT: Negative for hearing loss.    Eyes: Negative for blurred vision and double vision.   Respiratory: Negative for shortness of breath.    Cardiovascular: Negative for chest pain and palpitations.   Gastrointestinal: Negative for constipation, diarrhea, nausea and vomiting.   Genitourinary: Negative for dysuria.   Musculoskeletal: Negative for back pain and neck pain.   Skin: Negative for rash.   Neurological: Negative.    Endo/Heme/Allergies: Negative.    Psychiatric/Behavioral:        See HPI           PAST MEDICAL HISTORY   Past Medical History:   Diagnosis Date    Anxiety     Depression     History of psychiatric hospitalization     Hx of psychiatric care     Psychiatric problem     Therapy            PSYCHOTROPIC MEDICATIONS   Scheduled Meds:   folic acid  1 mg Oral Daily    multivitamin  1 tablet Oral Daily  "   nicotine  1 patch Transdermal Daily    OLANZapine  15 mg Oral QHS    OLANZapine  5 mg Oral Daily    thiamine  100 mg Oral Daily     Continuous Infusions:  PRN Meds:.acetaminophen, aluminum-magnesium hydroxide-simethicone, hydrOXYzine HCL, ibuprofen, magnesium hydroxide 400 mg/5 ml, OLANZapine **AND** OLANZapine        EXAMINATION    VITALS   Vitals:    04/03/22 0800 04/03/22 1100 04/03/22 2015 04/04/22 0729   BP: (!) 109/54  (!) 113/56 (!) 123/58   BP Location: Right arm  Right arm Right arm   Patient Position: Sitting  Sitting Lying   Pulse: 71  87 83   Resp: 16  18 18   Temp: 98.3 °F (36.8 °C)  97.2 °F (36.2 °C) 98.1 °F (36.7 °C)   TempSrc: Temporal  Temporal Temporal   SpO2: 98%      Weight:  78 kg (172 lb 1.1 oz)     Height:           Body mass index is 23.34 kg/m².        CONSTITUTIONAL  General Appearance: unremarkable, age appropriate, normal weight, well nourished    MUSCULOSKELETAL  Muscle Strength and Tone:no tremor, no tic  Abnormal Involuntary Movements: None  Gait and Station: non-ataxic    PSYCHIATRIC   Level of Consciousness: awake and alert   Orientation: person, place, time and situation  Grooming: Casually dressed and Well groomed  Psychomotor Behavior: normal, cooperative; no PMA/R  Speech: slowed, increased latency of response, non-spontaneous, monotone  Language: grossly intact; English, fluent  Mood: "okay"  Affect: less Constricted- improving  Thought Process: linear, logical, goal directed  Associations: intact; no shabana   Thought Content: +delusions; denied SI/HI  Perceptions: +AH and denies  VH  Memory: Able to recall past events, Remote intact and Recent intact  Attention: Attends to interview without distraction  Fund of Knowledge: Aware of current events and Vocabulary appropriate   Estimate if Intelligence:  Average based on work/education history, vocabulary and mental status exam  Insight: has awareness of illness- fair  Judgment: behavior is adequate to circumstances- " fair        DIAGNOSTIC TESTING   Laboratory Results  No results found for this or any previous visit (from the past 24 hour(s)).        ASSESSMENT         Schizoaffective disorder, depressed type, chronic, with acute exacerbation, severe  DORIAN  Panic disorder     Nicotine dependence     Elevated LFTs     Alcohol abuse             PROBLEM LIST AND MANAGEMENT PLANS        Schizoaffective disorder, depressed type, chronic, with acute exacerbation, severe  - started/continue zyprexa 5 mg PO qhs  -increase to 7.5 mg PO qhs -increase to 10 mg PO qhs- increase to 5 mg po q day and 15 mg po q HS  - consider AD if needed  - follow up with outpatient mental health provider after discharge  - pt counseled     DORIAN  - started/continue hydroxyzine 50 mg PO q 6 hours PRN  - consider AD if needed  - follow up with outpatient mental health provider after discharge  - pt counseled     Panic disorder  - started/continue hydroxyzine 50 mg PO q 6 hours PRN  - consider AD if needed  - follow up with outpatient mental health provider after discharge  - pt counseled     Nicotine dependence  - started/continue nicotine patch 14 mg PO qd PRN        Elevated LFTs  - possibly alcohol related  - hep panel, in process  - monitor, improving       Alcohol abuse  - SW consulted for assistance with additional resources   -  patient  - monitor for w/d    Alcohol Brief Intervention    1. Discussed with patient that there is concern he/she is drinking at unhealthy levels known to increase his/her risk of alcohol-related health problems - Yes  2. Discussed general feedback on health risks associated with drinking and/or given personalized feedback - Yes  3. Patient was advised to abstain from alcohol use - Yes        Discussed diagnosis, risks and benefits of proposed treatment vs alternative treatments vs no treatment, potential side effects of these treatments and the inherent unpredictability of treatment. The patient expresses understanding of  the above and displays the capacity to agree with this treatment given said understanding. Patient also agrees that, currently, the benefits outweigh the risks and would like to pursue/continue treatment at this time.      DISCHARGE PLANNING  Expected Disposition Plan: Home or Self Care- the patient is improving and will be stable for discharge home tomorrow       NEED FOR CONTINUED HOSPITALIZATION  Psychiatric illness continues to pose a potential threat to life or bodily function, of self or others, thereby requiring the need for continued inpatient psychiatric hospitalization: Yes, due to: gravely disabled    Protective inpatient pyschiatric hospitalization required while a safe disposition plan is enacted: Yes    Patient stabilized and ready for discharge from inpatient psychiatric unit: No        STAFF:   Sanchez Goldberg MD  Psychiatry

## 2022-04-05 VITALS
HEART RATE: 75 BPM | RESPIRATION RATE: 18 BRPM | SYSTOLIC BLOOD PRESSURE: 96 MMHG | BODY MASS INDEX: 23.31 KG/M2 | DIASTOLIC BLOOD PRESSURE: 54 MMHG | TEMPERATURE: 98 F | OXYGEN SATURATION: 98 % | WEIGHT: 172.06 LBS | HEIGHT: 72 IN

## 2022-04-05 PROCEDURE — 25000003 PHARM REV CODE 250: Performed by: STUDENT IN AN ORGANIZED HEALTH CARE EDUCATION/TRAINING PROGRAM

## 2022-04-05 PROCEDURE — 99239 PR HOSPITAL DISCHARGE DAY,>30 MIN: ICD-10-PCS | Mod: ,,, | Performed by: PSYCHIATRY & NEUROLOGY

## 2022-04-05 PROCEDURE — 99239 HOSP IP/OBS DSCHRG MGMT >30: CPT | Mod: ,,, | Performed by: PSYCHIATRY & NEUROLOGY

## 2022-04-05 PROCEDURE — 90833 PSYTX W PT W E/M 30 MIN: CPT | Mod: ,,, | Performed by: PSYCHIATRY & NEUROLOGY

## 2022-04-05 PROCEDURE — 25000003 PHARM REV CODE 250: Performed by: PSYCHIATRY & NEUROLOGY

## 2022-04-05 PROCEDURE — 90833 PR PSYCHOTHERAPY W/PATIENT W/E&M, 30 MIN (ADD ON): ICD-10-PCS | Mod: ,,, | Performed by: PSYCHIATRY & NEUROLOGY

## 2022-04-05 RX ORDER — OLANZAPINE 5 MG/1
5 TABLET ORAL DAILY
Qty: 30 TABLET | Refills: 2 | Status: SHIPPED | OUTPATIENT
Start: 2022-04-06 | End: 2023-04-03 | Stop reason: SDUPTHER

## 2022-04-05 RX ORDER — OLANZAPINE 15 MG/1
15 TABLET ORAL NIGHTLY
Qty: 30 TABLET | Refills: 2 | Status: SHIPPED | OUTPATIENT
Start: 2022-04-05 | End: 2023-01-03 | Stop reason: SDUPTHER

## 2022-04-05 RX ORDER — IBUPROFEN 200 MG
1 TABLET ORAL DAILY
COMMUNITY
Start: 2022-04-05

## 2022-04-05 RX ADMIN — FOLIC ACID 1 MG: 1 TABLET ORAL at 08:04

## 2022-04-05 RX ADMIN — OLANZAPINE 5 MG: 5 TABLET, FILM COATED ORAL at 08:04

## 2022-04-05 RX ADMIN — THERA TABS 1 TABLET: TAB at 08:04

## 2022-04-05 RX ADMIN — Medication 100 MG: at 08:04

## 2022-04-05 NOTE — PLAN OF CARE
Patient guarded, quiet, and withdrawn.  Denies intentions to harm self and/or others at this time. Denies auditory and/or visual hallucinations.  Affect and emotion appropriate.  Thoughts are linear and logical.  Isolates in bed; minimal interactions  with peers and staff. Often observed making phone calls with appropriate interactions.  Accepts meals and medications. Discussed medication and plan of care; patient needs continued education and reinforcement of medication compliance and healthy coping skills in an outpatient setting.

## 2022-04-05 NOTE — NURSING
All belongings accounted for and will be physically given to patient upon discharge.  Patient denies intentions to harm self and/or others at this time.  No acute distress noted. Will discharge to home via family vechicle .  Patient educated on discharge plan, aftercare appointments, and medications.

## 2022-04-05 NOTE — PLAN OF CARE
Plan of care reviewed. Denies intent to harm self or others at this time. Sitting in dayroom, watching TV. Denies hallucinations, delusions. Accepts snacks and medications. Gait steady, no falls. Interacting with staff and peers. Promoted individualized safety plan, reality-based interactions, effective coping strategies, and impulse control. Will continue precautions and monitor for safety.

## 2022-04-05 NOTE — CARE UPDATE
[facility]  Encounter Date: 3/29/2022  7:39 PM    Discharge Date No discharge date for patient encounter.   Hospital Account: [id number]    MRN: [id number]   Guarantor: GUNNER VILLANUEVA   Contact Serial #: [id number]         ENCOUNTER             Patient Class: IP Psych   Unit: [facility]   Hospital Service: Psychiatry   Bed: [address] W   Admitting Provider: Zaid Matson Iii, Md   Referring Physician:     Attending Provider: Zaid Matson Iii, Md   Adm Diagnosis: Psychosis [F29]      PATIENT                  Name: GUNNER VILLANUEVA : 1980 (41 yrs)   Address: [address] [place] [address] Sex: Male   City: [place] [address]       Primary Care Provider: Primary Doctor No         Primary Phone: [phone]   EMERGENCY CONTACT   Contact Name Legal Guardian? Relationship to Patient Home Phone Work Phone Mobile Phone   1. Etelvina Villanueva  2. AnthonySamysoham Nash Sister  Brother    [phone]      GUARANTOR                  Guarantor: ZANE VILLANUEVAJEREMIAH REYES       : 1980   Address: [address] [place] [address]    Sex: [place]     [place] [address] Guarantor  Type: B/H   Relation to Patient: Self         Home Phone: [phone]   Guarantor ID: [id number]         Work Phone:     GUARANTOR EMPLOYER     Employer: [employer]           Status: FULL TIME      COVERAGE          PRIMARY INSURANCE   Payor / Plan: - No Primary Coverage -       Group Number:         Subscriber Name:   Subscriber :     Subscriber ID:   Pat. Rel. to Subscriber:     Insurance Address:         SECONDARY INSURANCE   Payor / Plan: - No Secondary Coverage -       Group Number:         Subscriber Name:   Subscriber :     Subscriber ID:   Pat. Rel. to Subscriber:     Insurance Address:            Contact Serial # ([id number])         2022    Chart ID ([id number])

## 2022-04-05 NOTE — PROGRESS NOTES
PSYCHOTHERAPY ADD-ON +[id number]   16-37 minutes     Time: 16 minutes  Participants: Met with patient     Therapeutic Intervention Type: insight oriented psychotherapy, behavior modifying psychotherapy, supportive psychotherapy, interactive psychotherapy  Why chosen therapy is appropriate versus another modality: relevant to diagnosis, patient responds to this modality, evidence based practice     Target symptoms: psychosis  Primary focus: psychosis, depression  Psychotherapeutic techniques: supportive and psycho-educational techniques  -safety planning and wrap up sesssion     Outcome monitoring methods: self-report, observation     Patient's response to intervention:  The patient's response to intervention is accepting.     Progress toward goals:  The patient's progress toward goals is good.    Sanchez Goldberg MD  Psychiatry

## 2022-04-05 NOTE — PSYCH
Patient will be following up with [facility] at [address] in [place], [phone].  Appointment will be on 4/25/2022 at 1:15 pm.  Patient will receive tobacco cessation therapy and addictions counseling along with substance abuse therapy due to an elevated ETOH level on admit.  Patient had a negative UDS on admit.  AVS faxed to [phone] on 4/5/2022 at 11:09 am.

## 2022-04-05 NOTE — DISCHARGE SUMMARY
"Discharge Summary  Psychiatry    Admit Date: 3/29/2022    Discharge Date and Time:  04/05/2022 10:28 AM    Attending Physician: Zaid Matson III, MD; Sanchez Goldberg MD     Discharge Provider: Sanchez Goldberg    Reason for Admission:  hallucinations    History of Present Illness:   The patient presented to the ER on 3/29/2022 with complaints of hallucinations     The patient was medically cleared and admitted to the U.           Per RN:  Patient to ED with c/o hearing voices. Patient reports "my mind is restless." Patient denies HI or SI.     Per ED MD:  41-year-old male with history of anxiety presenting with 2-3 days of altered mood and auditory hallucinations.  He is here with his brother he states that patient was hanging out with a female friend of his, and he notice that when he returned from work (his brother works as a , and is ofte gone for weeks at a time) he notice that his brother was acting differently.  Patient denies any suicidal or homicidal ideations.  States that he feels as though he "can't live in the well today".  Denies visual hallucinations.        Psychiatric interview:  Patient states that his auditory hallucinations, depressed mood with anxiety are his main complaints. Upon inquiring further about AH, the patient was reluctant and declined to describe the voices and the content of what these voices were saying, would only disclose one example, "dicks in your mouth." The patient also admits to the voices directing him to do things. The patient reports as feeling uncomfortable describing the content of the voices. Patient admits to medication non compliance and has not been following up with mental health provider.           Symptoms of Depression: diminished mood - Yes, loss of interest/anhedonia - Yes;  recurrent - Yes, >14 days - Yes, diminished energy - No, change in sleep - Yes, change in appetite - No, diminished concentration or cognition or indecisiveness - Yes, " "PMA/R -  Yes, excessive guilt or hopelessness or worthlessness - Yes, suicidal ideations - No      Changes in Sleep: trouble with initiation- Yes, maintenance, - Yes early morning awakening with inability to return to sleep - Yes, hypersomnolence - No     Suicidal- active/passive ideations - No, organized plans, future intentions - No     Homicidal ideations: active/passive ideations - No, organized plans, future intentions - No     Symptoms of psychosis: hallucinations - Yes, delusions - Yes, disorganized speech - Yes, disorganized behavior or abnormal motor behavior - No, or negative symptoms (diminshed emotional expression, avolition, anhedonia, alogia, asociality) - Yes, active phase symptoms >1 month - Yes, continuous signs of illness > 6 months - Yes, since onset of illness decreased level of functioning present - Yes     Symptoms of janna or hypomania: elevated, expansive, or irritable mood with increased energy or activity - No; > 4 days - No,  >7 days - No; with inflated self-esteem or grandiosity - No, decreased need for sleep - No, increased rate of speech - No, FOI or racing thoughts - Yes, distractibility - Yes, increased goal directed activity or PMA - No, risky/disinhibited behavior - No     Symptoms of DORIAN: excessive anxiety/worry/fear, more days than not, about numerous issues - Yes, ongoing for >6 months - Yes, difficult to control - Yes, with restlessness - Yes, fatigue - yes, poor concentration - Yes, irritability - Yes, muscle tension - No, sleep disturbance - Yes; causes functionally impairing distress - Yes     Symptoms of Panic Disorder: recurrent panic attacks (palpitations/heart racing, sweating, shakiness, dyspnea, choking, chest pain/discomfort, Gi symptoms, dizzy/lightheadedness, hot/col flashes, paresthesias, derealization, fear of losing control or fear of dying or fear of "going crazy") - Yes, precipitated - No, un-precipitated - Yes, source of worry and/or behavioral changes " "secondary for 1 month or longer- Yes, agoraphobia - No     Symptoms of PTSD: h/o trauma exposure - No; re-experiencing/intrusive symptoms - No, avoidant behavior - No, 2 or more negative alterations in cognition or mood - No, 2 or more hyperarousal symptoms - No; with dissociative symptoms - No, ongoing for 1 or more  months - No     Symptoms of OCD: obsessions (recurrent thoughts/urges/images; intrusive and/or unwanted; uses other thoughts/actions to suppress) - No; compulsions (repetitive behaviors used to lower distress/anxiety/obsessions) - No, time-consuming (over 1 hour per day) or cause significant distress/impairment - - No     Symptoms of Anorexia: restriction of caloric intake leading to significantly low body weight - No, intense fear of gaining weight or persistent behavior that interferes with weight gain even thought at a significantly low weight - No, disturbance in the way in which one's body weight or shape is experienced, undue influence of body weight or shape on self evaluation, or persistent lack of recognition of the seriousness of the current low body weight - No     Symptoms of Bulimia: recurrent episodes of binge eating (definitely larger amount  than what others would eat and lack of a sense of control over eating during episode) - No, recurrent inappropriate compensatory behaviors in order to prevent weight gain (fasting, medications, exercise, vomiting) - No, binges and compensatory behaviors both occur on average at least once a week for 3 months - No, self evaluations is unduly influenced by body shape/weight- - No              Per chart review:  The patient presented to the ER on 1/1/21 with complaints of psychosis. Per the staff notes:  -40 year-old male admitted to [facility] from [facility] ER accompanied by [facility] and . Patient is oriented x4 and is cooperative. No contraband noted with metal detector scan and skin assessment.       Patient states he was having "bad thoughts and " "feels jittery". Admits to having auditory hallucinations but declines to talk about them. Denies delusions, homicidal and suicidal ideation. Patient is reluctant to answer questions and is a poor historian. When asked about stressors, stated he was in conflict with several family members. States he lives alone and works full-time as a . He states he has 1.5 years of college and was arrested 5 years ago because he was in an altercation. He has no outstanding court dates. He states he drinks 2 cans of beer per day for approximately 10 years. He denies current drug use but has a past history of taking 2 to 3 Lortabs per day. States stopped abusing hydrocodone 3 years ago with psychiatric care. States smokes 1.5 packs cigarettes per day and is not ready to quit. He initially denied having any health problems but at the end of the interview he stated he has "AIDS". When questioned, he was unable to answer how long he has had it or who treated him for it. He states he sees a doctor in the [facility] but is unable to name the particular physician. Unable to find any information regarding HIV status in medical record nor was it reported by [facility] ER nurse prior to admit.     The patient was medically cleared and admitted to the U.      The patient reports that he has been hearing voices over the last year with increasing frequency and intensity. He was vague and guarded in describing them. He reports that he told his sister about them yesterday, she became concerned, and he was brought to the hospital.      He denied current depression symptoms, but later reported having "ups and downs" and recent depressive symptoms. He also reports anxiety issues.      He was very vague and guarded during throughout the interview and often gave conflicting information    Procedures Performed: * No surgery found *    Hospital Course:    Patient was admitted to the inpatient psychiatry unit after being medically cleared in the " ED. Chart and labs were reviewed. The patient was stabilized as follows:      Schizoaffective disorder, depressed type, chronic, with acute exacerbation, severe  - started/continue zyprexa 5 mg PO qhs  -increase to 7.5 mg PO qhs -increase to 10 mg PO qhs- increase to/continue at 5 mg po q day and 15 mg po q HS  - consider AD if needed  - follow up with outpatient mental health provider after discharge  - pt counseled     DORIAN  - started/continue hydroxyzine 50 mg PO q 6 hours PRN  - consider AD if needed  - follow up with outpatient mental health provider after discharge  - pt counseled     Panic disorder  - started/continue hydroxyzine 50 mg PO q 6 hours PRN  - consider AD if needed  - follow up with outpatient mental health provider after discharge  - pt counseled     Nicotine dependence  - started/continue nicotine patch 14 mg PO qd PRN  Tobacco Use Treatment Practical Counseling    Were the following discussed with the patient:    1. Recognizing danger situations:    A. Alcohol use during the first month after quitting - Yes   B. Being around smoke and/or smokers or time/situations when the patient routinely smoked - Yes   C. Triggers and/or roadblocks are the same as danger situations - Yes    2.   Developing coping skills   A. Learning new ways to manage stress - Yes   B. Exercising and/or relaxation breathing - Yes   C. Changing routines - Yes   D. Distraction techniques to prevent tobacco use - Yes    3.   Basic information about quitting:   A. Benefits of quitting tobacco - Yes   B. How to quit techniques - Yes   C. Available resources to support quitting - Yes       Elevated LFTs  - possibly alcohol related  - hep panel, negative  - monitor, improving        Alcohol abuse  - SW consulted for assistance with additional resources   -  patient  - monitor for w/d     Alcohol Brief Intervention     1. Discussed with patient that there is concern he/she is drinking at unhealthy levels known to increase  his/her risk of alcohol-related health problems - Yes  2. Discussed general feedback on health risks associated with drinking and/or given personalized feedback - Yes  3. Patient was advised to abstain from alcohol use - Yes          During hospitalization, the patient was encouraged to go to both groups and individual counseling. Patient was monitored for any side effects. A meeting was held with multidisciplinary team prior to discharge and pt's diagnosis, current medications, and follow up were discussed. The patient has been compliant with treatment and can adequately attend to activities of daily living in an independent manner. The patient denies any side effects. The patient denies SI, HI, plan or intent for self harm or harm to others. The patient is no longer a danger to self or others nor gravely disabled disabled. Patient discharged  in stable condition with scheduled outpatient follow up.    Today the patient reports that he is doing much better. He notes improved symptoms of psychosis. He feels that the medications are helping. He is requesting discharge. He is currently stable for discharge home and is able.williing to attend outpatient care.      He feels that all symptoms are improving as documented below. He has his own trailer. He plans to follow up at the local [facility]. He wants to get back to work.\  -He is hopeful, future oriented and goal directed        The patient denies any side effects to medications.              Psychiatric ROS (observed, reported, or endorsed/denied):  Depressed mood - improved  Interest/pleasure/anhedonia: improved  Guilt/hopelessness/worthlessness - improved  Changes in Sleep - improved  Changes in Appetite - improved  Changes in Concentration - improved  Changes in Energy - improved  PMA/R- improved  Suicidal- active/passive ideations - denies  Homicidal ideations: active/passive ideations - denies     Hallucinations - improved  Delusions - improved   Disorganized  behavior - None  Disorganized speech - None  Negative symptoms - improved   Elevated mood - No  Decreased need for sleep - No  Grandiosity - No  Racing thoughts - improved  Impulsivity - denies  Irritability- improved  Increased energy - No  Distractibility - No  Increase in goal-directed activity or PMA- No     Symptoms of DORIAN - improved  Symptoms of Panic Disorder- improved  Symptoms of PTSD - None         Discussed diagnosis, risks and benefits of proposed treatment vs alternative treatments vs no treatment, and potential side effects of these treatments.  The patient expresses understanding of the above and displays the capacity to agree with this treatment given said understanding.  Patient also agrees that, currently, the benefits outweigh the risks and would like to pursue treatment at this time.      Discharge MSE: stated age, casually dressed, well groomed.  No psychomotor agitation or retardation.  No abnormal involuntary movements.  Gait normal.  Speech normal, conversational.  Language fluent English. Mood fine.  Affect normal range, pleasant, euthymic.  Thought process linear.  Associations intact.  Denies suicidal or homicidal ideation.  Denies auditory hallucinations, paranoid ideation, ideas of reference.  Memory intact.  Attention intact.  Fund of knowledge intact.  Insight intact.  Judgment intact.  Alert and oriented to person, place, time.      Tobacco Usage:  Is patient a smoker? Yes  Does patient want prescription for Tobacco Cessation? No  Does patient want counseling for Tobacco Cessation? No    If patient would like to quit, then over the counter nicotine patch could be used. The patient could also follow up with his PCP or psychiatric provider for other alternatives.     Final Diagnoses:    Principal Problem: Schizoaffective disorder, depressed type, chronic, with acute exacerbation, severe   Secondary Diagnoses:   DORIAN  Panic disorder     Nicotine dependence     Elevated LFTs     Alcohol  abuse    Labs:  Admission on 03/29/2022   Component Date Value Ref Range Status    Hemoglobin A1C 03/29/2022 5.4  4.0 - 5.6 % Final    Estimated Avg Glucose 03/29/2022 108  68 - 131 mg/dL Final    Cholesterol 03/29/2022 141  120 - 199 mg/dL Final    Triglycerides 03/29/2022 143  30 - 150 mg/dL Final    HDL 03/29/2022 41  40 - 75 mg/dL Final    LDL Cholesterol 03/29/2022 71.4  63.0 - 159.0 mg/dL Final    HDL/Cholesterol Ratio 03/29/2022 29.1  20.0 - 50.0 % Final    Total Cholesterol/HDL Ratio 03/29/2022 3.4  2.0 - 5.0 Final    Non-HDL Cholesterol 03/29/2022 100  mg/dL Final    Sodium 03/31/2022 143  136 - 145 mmol/L Final    Potassium 03/31/2022 4.8  3.5 - 5.1 mmol/L Final    Chloride 03/31/2022 106  95 - 110 mmol/L Final    CO2 03/31/2022 26  23 - 29 mmol/L Final    Glucose 03/31/2022 101  70 - 110 mg/dL Final    BUN 03/31/2022 19  6 - 20 mg/dL Final    Creatinine 03/31/2022 1.2  0.5 - 1.4 mg/dL Final    Calcium 03/31/2022 9.4  8.7 - 10.5 mg/dL Final    Total Protein 03/31/2022 6.8  6.0 - 8.4 g/dL Final    Albumin 03/31/2022 3.8  3.5 - 5.2 g/dL Final    Total Bilirubin 03/31/2022 0.4  0.1 - 1.0 mg/dL Final    Alkaline Phosphatase 03/31/2022 59  55 - 135 U/L Final    AST 03/31/2022 66 (A) 10 - 40 U/L Final    ALT 03/31/2022 63 (A) 10 - 44 U/L Final    Anion Gap 03/31/2022 11  8 - 16 mmol/L Final    eGFR if African American 03/31/2022 >60  >60 mL/min/1.73 m^2 Final    eGFR if non African American 03/31/2022 >60  >60 mL/min/1.73 m^2 Final    Hepatitis B Surface Ag 03/31/2022 Negative  Negative Final    Hep B C IgM 03/31/2022 Negative  Negative Final    Hep A IgM 03/31/2022 Negative  Negative Final    Hepatitis C Ab 03/31/2022 Negative  Negative Final   Admission on 03/29/2022, Discharged on 03/29/2022   Component Date Value Ref Range Status    WBC 03/29/2022 7.96  3.90 - 12.70 K/uL Final    RBC 03/29/2022 4.45 (A) 4.60 - 6.20 M/uL Final    Hemoglobin 03/29/2022 14.2  14.0 - 18.0 g/dL  Final    Hematocrit 03/29/2022 42.7  40.0 - 54.0 % Final    MCV 03/29/2022 96  82 - 98 fL Final    MCH 03/29/2022 31.9 (A) 27.0 - 31.0 pg Final    MCHC 03/29/2022 33.3  32.0 - 36.0 g/dL Final    RDW 03/29/2022 13.3  11.5 - 14.5 % Final    Platelets 03/29/2022 309  150 - 450 K/uL Final    MPV 03/29/2022 9.9  9.2 - 12.9 fL Final    Immature Granulocytes 03/29/2022 0.3  0.0 - 0.5 % Final    Gran # (ANC) 03/29/2022 4.2  1.8 - 7.7 K/uL Final    Immature Grans (Abs) 03/29/2022 0.02  0.00 - 0.04 K/uL Final    Lymph # 03/29/2022 2.6  1.0 - 4.8 K/uL Final    Mono # 03/29/2022 0.5  0.3 - 1.0 K/uL Final    Eos # 03/29/2022 0.5  0.0 - 0.5 K/uL Final    Baso # 03/29/2022 0.07  0.00 - 0.20 K/uL Final    nRBC 03/29/2022 0  0 /100 WBC Final    Gran % 03/29/2022 52.7  38.0 - 73.0 % Final    Lymph % 03/29/2022 33.0  18.0 - 48.0 % Final    Mono % 03/29/2022 6.7  4.0 - 15.0 % Final    Eosinophil % 03/29/2022 6.4  0.0 - 8.0 % Final    Basophil % 03/29/2022 0.9  0.0 - 1.9 % Final    Differential Method 03/29/2022 Automated   Final    Sodium 03/29/2022 144  136 - 145 mmol/L Final    Potassium 03/29/2022 3.5  3.5 - 5.1 mmol/L Final    Chloride 03/29/2022 108  95 - 110 mmol/L Final    CO2 03/29/2022 25  23 - 29 mmol/L Final    Glucose 03/29/2022 114 (A) 70 - 110 mg/dL Final    BUN 03/29/2022 14  6 - 20 mg/dL Final    Creatinine 03/29/2022 1.1  0.5 - 1.4 mg/dL Final    Calcium 03/29/2022 9.9  8.7 - 10.5 mg/dL Final    Total Protein 03/29/2022 7.6  6.0 - 8.4 g/dL Final    Albumin 03/29/2022 4.1  3.5 - 5.2 g/dL Final    Total Bilirubin 03/29/2022 0.5  0.1 - 1.0 mg/dL Final    Alkaline Phosphatase 03/29/2022 67  55 - 135 U/L Final    AST 03/29/2022 109 (A) 10 - 40 U/L Final    ALT 03/29/2022 64 (A) 10 - 44 U/L Final    Anion Gap 03/29/2022 11  8 - 16 mmol/L Final    eGFR if African American 03/29/2022 >60  >60 mL/min/1.73 m^2 Final    eGFR if non African American 03/29/2022 >60  >60 mL/min/1.73 m^2 Final     TSH 03/29/2022 1.180  0.400 - 4.000 uIU/mL Final    Specimen UA 03/29/2022 Urine, Clean Catch   Final    Color, UA 03/29/2022 Yellow  Yellow, Straw, Yahaira Final    Appearance, UA 03/29/2022 Clear  Clear Final    pH, UA 03/29/2022 7.0  5.0 - 8.0 Final    Specific Gravity, UA 03/29/2022 1.020  1.005 - 1.030 Final    Protein, UA 03/29/2022 Negative  Negative Final    Glucose, UA 03/29/2022 Negative  Negative Final    Ketones, UA 03/29/2022 Negative  Negative Final    Bilirubin (UA) 03/29/2022 Negative  Negative Final    Occult Blood UA 03/29/2022 Negative  Negative Final    Nitrite, UA 03/29/2022 Negative  Negative Final    Urobilinogen, UA 03/29/2022 1.0  <2.0 EU/dL Final    Leukocytes, UA 03/29/2022 Negative  Negative Final    Benzodiazepines 03/29/2022 Negative  Negative Final    Methadone metabolites 03/29/2022 Negative  Negative Final    Cocaine (Metab.) 03/29/2022 Negative  Negative Final    Opiate Scrn, Ur 03/29/2022 Negative  Negative Final    Barbiturate Screen, Ur 03/29/2022 Negative  Negative Final    Amphetamine Screen, Ur 03/29/2022 Negative  Negative Final    THC 03/29/2022 Negative  Negative Final    Phencyclidine 03/29/2022 Negative  Negative Final    Creatinine, Urine 03/29/2022 207.1  23.0 - 375.0 mg/dL Final    Toxicology Information 03/29/2022 SEE COMMENT   Final    Alcohol, Serum 03/29/2022 71 (A) <10 mg/dL Final    Acetaminophen (Tylenol), Serum 03/29/2022 <3.0 (A) 10.0 - 20.0 ug/mL Final    SARS-CoV-2 RNA, Amplification, Qual 03/29/2022 Negative  Negative Final         Discharged Condition: stable and improved; not currently a danger to self/others or gravely disabled    Disposition: Home or Self Care    Is patient being discharged on multiple neuroleptics? No    Follow Up/Patient Instructions:     · Take all medications as prescribed.  · Attend all psychiatric and medical follow up appointments.   · Abstain from all drugs and alcohol.  · Call the crisis line at:  [phone] for help in a crisis and emergent situations or call 911 and Return to ED for any acute worsening of your condition including suicidal or homicidal ideations      No discharge procedures on file.   Follow-up Information     [facility] Follow up.    Specialty: Psychiatry  Contact information:  [address]  [address]  [place] [address]  [phone]                           Follow up apt: as above      Medications:  Reconciled Home Medications:      Medication List      START taking these medications    * OLANZapine 15 MG Tab  Commonly known as: ZyPREXA  Take 1 tablet (15 mg total) by mouth every evening.     * OLANZapine 5 MG tablet  Commonly known as: ZyPREXA  Take 1 tablet (5 mg total) by mouth once daily.  Start taking on: April 6, 2022         * This list has 2 medication(s) that are the same as other medications prescribed for you. Read the directions carefully, and ask your doctor or other care provider to review them with you.            CONTINUE taking these medications    nicotine 14 mg/24 hr  Commonly known as: NICODERM CQ  Place 1 patch onto the skin once daily.        STOP taking these medications    diphenhydrAMINE 50 MG capsule  Commonly known as: BENADRYL     esomeprazole 40 MG capsule  Commonly known as: NEXIUM     mirtazapine 15 MG tablet  Commonly known as: REMERON     risperiDONE 3 MG Tab  Commonly known as: RISPERDAL     sertraline 100 MG tablet  Commonly known as: ZOLOFT              Diet: regular     Activity as tolerated    Total time spent discharging patient: 35 minutes    Sanchez Goldberg MD  Psychiatry

## 2022-12-20 ENCOUNTER — TELEPHONE (OUTPATIENT)
Dept: PSYCHIATRY | Facility: CLINIC | Age: 42
End: 2022-12-20
Payer: COMMERCIAL

## 2022-12-20 RX ORDER — OLANZAPINE 5 MG/1
5 TABLET ORAL DAILY
Qty: 30 TABLET | Refills: 2 | OUTPATIENT
Start: 2022-12-20 | End: 2023-12-20

## 2022-12-20 RX ORDER — OLANZAPINE 15 MG/1
15 TABLET ORAL NIGHTLY
Qty: 30 TABLET | Refills: 2 | OUTPATIENT
Start: 2022-12-20 | End: 2023-12-20

## 2022-12-20 NOTE — TELEPHONE ENCOUNTER
----- Message from Katlyn Turner sent at 2022 10:46 AM CST -----  Contact: shweta/sister  Jean Cruz  MRN: [id number]  : 1980  PCP: Primary Doctor No  Home Phone      [phone]  Work Phone      Not on file.  Mobile          [phone]      MESSAGE: Refill on [facility]  Pharmacy [facility]      Phone [phone]

## 2022-12-20 NOTE — TELEPHONE ENCOUNTER
Informed patient that he was instructed to follow up with an outpatient psychiatrist upon his discharge in March. He stated he did not follow up with that clinic. Explained to him that Dr Goldberg will not continue to fill his medications and he needs to contact [facility] to schedule an appointment with a Psychiatrist who can oversee his medications. He voiced understanding.

## 2023-01-02 ENCOUNTER — NURSE TRIAGE (OUTPATIENT)
Dept: ADMINISTRATIVE | Facility: CLINIC | Age: 43
End: 2023-01-02

## 2023-01-02 NOTE — TELEPHONE ENCOUNTER
"Patient's sister states patient " is not himself today". Sister states patient is pacing and repeating the phrase  "everything is my fault". Sister states patient has a psychiatric history that includes Depression and Schizophrenia. Patient's sister also states patient with possible audible hallucinations.    Care Advice given to Go to ED Now for evaluation/treatment. Patient's sister states understanding of care advice.    Reason for Disposition   [1] Bizarre changes in behavior AND [2] sudden onset   [1] Schizophrenia AND [2] worsening (e.g., increasing voices, thinking less clearly, more agitated, less able to do activities of daily living)    Additional Information   Negative: [1] Age 6 or older AND [2] hitting or other aggressive behaviors   Negative: [1] Sleep training problem AND [2] child sleeps in a crib   Negative: [1] Sleep training problem AND [2] child sleeps in a bed   Negative: Toilet training problems   Negative: Eating problems   Negative: Anxiety or fear is main problem   Negative: [1] Age 6 or older AND [2] behavior problems not covered in this guideline   Negative: Patient attempted suicide   Negative: Patient is threatening suicide now   Negative: Hearing voices that are telling patient to commit suicide now   Negative: Violent behavior, or threatening to physically hurt or kill someone   Negative: Hearing voices that are telling patient to kill a specific person   Negative: [1] Patient is very confused (disoriented, slurred speech) AND [2] no other adult (e.g., friend or family member) available   Negative: [1] Difficult to awaken or acting very confused (disoriented, slurred speech) AND [2] new-onset   Negative: Drug overdose suspected   Negative: Sounds like a life-threatening emergency to the triager   Negative: Suicide thoughts, threats, attempts, or questions   Negative: Questions or concerns about alcohol use, unhealthy alcohol use, binge drinking, intoxication, or withdrawal   Negative: " "Questions or concerns about substance use (drug use), unhealthy drug use, intoxication, or withdrawal   Negative: [1] Schizophrenia AND [2] unable to do any of normal activities (e.g., self care, school, work; in comparison to baseline).   Negative: Head is twisting to one side (or ask "is it turning against your will?")   Negative: Patient sounds very sick or weak to the triager    Protocols used: Behavior Problems (Age 1-5)-P-AH, Schizophrenia-A-AH    "

## 2023-01-03 ENCOUNTER — HOSPITAL ENCOUNTER (EMERGENCY)
Facility: HOSPITAL | Age: 43
Discharge: HOME | End: 2023-01-03
Attending: SURGERY

## 2023-01-03 VITALS
OXYGEN SATURATION: 98 % | WEIGHT: 189.06 LBS | TEMPERATURE: 99 F | BODY MASS INDEX: 25.61 KG/M2 | DIASTOLIC BLOOD PRESSURE: 77 MMHG | HEIGHT: 72 IN | HEART RATE: 103 BPM | SYSTOLIC BLOOD PRESSURE: 136 MMHG | RESPIRATION RATE: 20 BRPM

## 2023-01-03 DIAGNOSIS — F20.9 SCHIZOPHRENIA, UNSPECIFIED TYPE: ICD-10-CM

## 2023-01-03 DIAGNOSIS — Z76.0 ENCOUNTER FOR MEDICATION REFILL: Primary | ICD-10-CM

## 2023-01-03 PROCEDURE — 99284 EMERGENCY DEPT VISIT MOD MDM: CPT

## 2023-01-03 RX ORDER — OLANZAPINE 5 MG/1
5 TABLET ORAL NIGHTLY
Qty: 14 TABLET | Refills: 0 | Status: SHIPPED | OUTPATIENT
Start: 2023-01-03 | End: 2023-01-17

## 2023-01-03 NOTE — ED PROVIDER NOTES
Encounter Date: 1/3/2023       History     Chief Complaint   Patient presents with    Medication Refill     Chief complaint: Medication refill  [age] year male with a history of anxiety depression Psychiatric Care and schizophrenia presents to be evaluated noted to have refill in his Zyprexa.  Patient states initially prescribed per Dr. Goldberg he has been able to see him to get a refill.  Reports being out of his medication for approximately 1 month.  Patient's father states that he has been calm and cooperative.  Patient denies any suicidal homicide ideations.  Denies any visual auditory hallucinations at present.  Does report he has been slightly more anxious unusual.  He appears calm appropriate in ED.    Review of patient's allergies indicates:  No Known Allergies  Past Medical History:   Diagnosis Date    Anxiety     Depression     History of psychiatric hospitalization     Hx of psychiatric care     Psychiatric problem     Therapy      History reviewed. No pertinent surgical history.  History reviewed. No pertinent family history.  Social History     Tobacco Use    Smoking status: Every Day     Packs/day: 1.00     Years: 21.00     Pack years: 21.00     Types: Cigarettes    Smokeless tobacco: Never   Substance Use Topics    Alcohol use: Yes     Alcohol/week: 1.0 standard drink     Types: 1 Cans of beer per week    Drug use: Never     Review of Systems   Constitutional:  Negative for activity change.   Respiratory:  Negative for shortness of breath.    Cardiovascular:  Negative for chest pain.   Gastrointestinal:  Negative for abdominal pain.   Psychiatric/Behavioral:  Negative for behavioral problems, confusion, self-injury, sleep disturbance and suicidal ideas. The patient is nervous/anxious.      Physical Exam     Initial Vitals [01/03/23 1222]   BP Pulse Resp Temp SpO2   136/77 103 20 98.7 °F (37.1 °C) 98 %      MAP       --         Physical Exam    Nursing note and vitals reviewed.  Constitutional:  He appears well-developed and well-nourished.   HENT:   Head: Normocephalic and atraumatic.   Cardiovascular:  Normal rate.     Exam reveals no gallop and no friction rub.       No murmur heard.  Pulmonary/Chest: Breath sounds normal. He has no wheezes. He has no rhonchi. He has no rales.     Neurological: He is alert and oriented to person, place, and time. He has normal strength.   Skin: Skin is warm and dry.   Psychiatric: He has a normal mood and affect. Thought content normal.       ED Course   Procedures  Labs Reviewed - No data to display       Imaging Results    None          Medications - No data to display  Medical Decision Making:   Differential Diagnosis:   Encounter for medication refill, schizophrenia anxiety, depression, suicide ideation, homicide ideation  ED Management:  Patient presents to be evaluated for refill on his Zyprexa   Patient states he is doing very well on his medication versus to resume.  Denies any suicidal homicidal ideations today   Patient appears quiet appropriate in ED   Will give 2 week supply with direct instructions to follow-up with psychiatry immediately to have medication continued and managed  Given strict return precautions including any thoughts of suicidal  or homicide ideations                        Clinical Impression:   Final diagnoses:  [Z76.0] Encounter for medication refill (Primary)  [F20.9] Schizophrenia, unspecified type        ED Disposition Condition    Discharge Stable          ED Prescriptions       Medication Sig Dispense Start Date End Date Auth. Provider    OLANZapine (ZYPREXA) 5 MG tablet Take 1 tablet (5 mg total) by mouth every evening. for 14 days 14 tablet 1/3/2023 1/17/2023 Luann Virgen NP          Follow-up Information    None          Luann Virgen NP  01/03/23 1345

## 2023-01-03 NOTE — ED TRIAGE NOTES
Patient here for refills on his mental health medication. Patient denies SI, HI, or A/V hallucinations.   Patient/Caregiver provided printed discharge information.

## 2023-01-03 NOTE — DISCHARGE INSTRUCTIONS
Call today to follow-up with your mental health practitioner as soon as you leave here today!  If you develop any suicidal or homicidal follow-up please immediately return to the emergency room or call [phone]

## 2023-04-03 ENCOUNTER — HOSPITAL ENCOUNTER (EMERGENCY)
Facility: HOSPITAL | Age: 43
Discharge: HOME | End: 2023-04-03
Attending: STUDENT IN AN ORGANIZED HEALTH CARE EDUCATION/TRAINING PROGRAM

## 2023-04-03 VITALS
DIASTOLIC BLOOD PRESSURE: 90 MMHG | HEIGHT: 72 IN | HEART RATE: 86 BPM | SYSTOLIC BLOOD PRESSURE: 127 MMHG | TEMPERATURE: 98 F | RESPIRATION RATE: 20 BRPM | BODY MASS INDEX: 24.07 KG/M2 | WEIGHT: 177.69 LBS | OXYGEN SATURATION: 97 %

## 2023-04-03 DIAGNOSIS — Z76.0 MEDICATION REFILL: Primary | ICD-10-CM

## 2023-04-03 PROCEDURE — 99282 EMERGENCY DEPT VISIT SF MDM: CPT

## 2023-04-03 RX ORDER — OLANZAPINE 5 MG/1
5 TABLET ORAL DAILY
Qty: 30 TABLET | Refills: 0 | Status: SHIPPED | OUTPATIENT
Start: 2023-04-03

## 2023-04-03 NOTE — ED TRIAGE NOTES
"Patient here for medication refills. Patient states "the names should be in the system" when asked what medication he needs refilled.  "

## 2023-04-03 NOTE — ED PROVIDER NOTES
Encounter Date: 4/3/2023       History     Chief Complaint   Patient presents with    Medication Refill     42 year old male with a PMHx of anxiety, schizophrenia presents to the ED with his brother requesting medication refill. He doesn't know exactly what meds they are but states they're in the system. He denies SI, HI. He was seen 2 months ago for the same. He has not followed up with mental health clinic.      He is calm, cooperative. Denies SI, HI, hallucinations.    The brother states he's been trying to get the patient to the clinic but the brother doesn't want to go or make an appointment. Brother states the patient is calm, cooperative.       Review of patient's allergies indicates:  No Known Allergies  Past Medical History:   Diagnosis Date    Anxiety     Depression     History of psychiatric hospitalization     Hx of psychiatric care     Psychiatric problem     Therapy      History reviewed. No pertinent surgical history.  History reviewed. No pertinent family history.  Social History     Tobacco Use    Smoking status: Every Day     Packs/day: 1.00     Years: 21.00     Pack years: 21.00     Types: Cigarettes    Smokeless tobacco: Never   Substance Use Topics    Alcohol use: Yes     Alcohol/week: 1.0 standard drink     Types: 1 Cans of beer per week    Drug use: Never     Review of Systems   Constitutional:  Negative for chills and fever.   HENT:  Negative for congestion, rhinorrhea and sneezing.    Eyes:  Negative for discharge and redness.   Respiratory:  Negative for cough and shortness of breath.    Cardiovascular:  Negative for chest pain and palpitations.   Gastrointestinal:  Negative for abdominal pain, diarrhea and vomiting.   Genitourinary:  Negative for difficulty urinating, flank pain and urgency.   Musculoskeletal:  Negative for back pain and neck pain.   Skin:  Negative for rash and wound.   Neurological:  Negative for weakness, numbness and headaches.     Physical Exam     Initial Vitals  [04/03/23 1021]   BP Pulse Resp Temp SpO2   (!) 127/90 86 20 98 °F (36.7 °C) (!) 87 %      MAP       --         Physical Exam    Nursing note and vitals reviewed.  Constitutional: He appears well-developed. He is not diaphoretic. No distress.   HENT:   Head: Normocephalic and atraumatic.   Right Ear: External ear normal.   Left Ear: External ear normal.   Nose: Nose normal.   Eyes: Conjunctivae are normal. Right eye exhibits no discharge. Left eye exhibits no discharge. No scleral icterus.   Cardiovascular:  Normal rate and regular rhythm.           Pulmonary/Chest: Breath sounds normal. No stridor. No respiratory distress. He has no wheezes. He has no rhonchi. He has no rales.   Abdominal: Abdomen is soft. He exhibits no distension. There is no abdominal tenderness. There is no guarding.   Musculoskeletal:         General: No edema.     Neurological: He is alert and oriented to person, place, and time. GCS score is 15. GCS eye subscore is 4. GCS verbal subscore is 5. GCS motor subscore is 6.   Skin: Skin is warm and dry. Capillary refill takes less than 2 seconds.   Psychiatric: He has a normal mood and affect.       ED Course   Procedures  Labs Reviewed - No data to display       Imaging Results    None          Medications - No data to display  Medical Decision Making:   Differential Diagnosis:   Ddx: med refill, SI, HI  ED Management:  Patient is appropriate in the ED. Brother states so as well. Doesn't appear to meet PEC criteria. Will refill 1 month and stress importance to f/u with psych. I advised him I will not refill his psych meds in the future and he needs to see psych. Brother and patient understands.                        Clinical Impression:   Final diagnoses:  [Z76.0] Medication refill (Primary)        ED Disposition Condition    Discharge Stable          ED Prescriptions       Medication Sig Dispense Start Date End Date Auth. Provider    OLANZapine (ZYPREXA) 5 MG tablet Take 1 tablet (5 mg total)  by mouth once daily. 30 tablet 4/3/2023 -- Otis Hartman DO          Follow-up Information       Follow up With Specialties Details Why Contact Info    [facility] Psychology, Psychiatry, Behavioral Health Schedule an appointment as soon as possible for a visit in 1 week  [address] [facility] [address]  [phone]               Otis Hartman DO  04/03/23 1040

## 2024-02-21 NOTE — PLAN OF CARE
"  Problem: Adult Behavioral Health Plan of Care  Goal: Optimized Coping Skills in Response to Life Stressors  Outcome: Ongoing, Progressing   Pt did not attend group. [facility] met with pt individually. Pt was isolating in room. Pt requested to be excused from group today. Pt was resting in bed quietly and stated " I am tired and I am not feeling well". [facility] educated pt on the importance of attending group.      " Alert and oriented to person, place and time

## 2024-02-29 NOTE — PLAN OF CARE
Problem: Adult Behavioral Health Plan of Care  Goal: Rounds/Family Conference  Outcome: Ongoing, Progressing  Flowsheets (Taken 3/31/2022 0906)  Participants:   psychiatrist   nursing   therapeutic recreation   other ([facility], discharge planner, med students)  TREATMENT TEAM      Chief Complaint:    Pt is a 41 year old male who presents with chief complaints of hallucinations.  Pt states he heard some filthy stuff from the voices that he heard and  he cares not to speak about it at this time and the voices are just getting the best of him.  Pt states the auditory and hallucinations were telling him to do things and depressed mood were his biggest complaints.         Pt Goal(s):    Pt states he would like to stop hearing voices.      Current Progress:   Pt attended treatment team dressed in blue scrubs.  Pt affect flat with dysphoric mood  Pt is auditory hallucinations that are telling him things that he prefers not to speak about at this time.  Pt states he has been hearing voices that started 2-3 years now.  Pt states he feels people are hearing what he is thinking.   Pt states he has been drinking tequila the last couple of days and has been drinking a lot to fall asleep due to him hearing voices.  Pt states today he will be able to cope with the voices and it is not as bad as they were when he came in. He is just feeling agitated at times and the agitation just comes and goes.  Program/groups:    Encourage pt to continue to attend groups.    Revisions to Plan:  Encourage pt to continue attendance to treatment team and to continue to attend all groups.            Hello, I have a referral to . Patient has a  diagnosis of Necrotizing fasciitis. I scheduled the patient in the next available NP slot 5/13. Not sure if 's nurse was to triage the patient prior to me scheduling. Please advise

## 2024-04-18 ENCOUNTER — HOSPITAL ENCOUNTER (EMERGENCY)
Facility: HOSPITAL | Age: 44
Discharge: ANOTHER HOSPITAL | End: 2024-04-19
Attending: SURGERY
Payer: COMMERCIAL

## 2024-04-18 DIAGNOSIS — F23 ACUTE PSYCHOSIS: Primary | ICD-10-CM

## 2024-04-18 LAB
ALBUMIN SERPL BCP-MCNC: 4 G/DL (ref 3.5–5.2)
ALP SERPL-CCNC: 63 U/L (ref 55–135)
ALT SERPL W/O P-5'-P-CCNC: 21 U/L (ref 10–44)
AMPHET+METHAMPHET UR QL: NEGATIVE
ANION GAP SERPL CALC-SCNC: 8 MMOL/L (ref 8–16)
APAP SERPL-MCNC: <3 UG/ML (ref 10–20)
AST SERPL-CCNC: 20 U/L (ref 10–40)
BARBITURATES UR QL SCN>200 NG/ML: NEGATIVE
BASOPHILS # BLD AUTO: 0.06 K/UL (ref 0–0.2)
BASOPHILS NFR BLD: 1 % (ref 0–1.9)
BENZODIAZ UR QL SCN>200 NG/ML: NEGATIVE
BILIRUB SERPL-MCNC: 0.2 MG/DL (ref 0.1–1)
BILIRUB UR QL STRIP: NEGATIVE
BUN SERPL-MCNC: 16 MG/DL (ref 6–20)
BZE UR QL SCN: NEGATIVE
CALCIUM SERPL-MCNC: 9.2 MG/DL (ref 8.7–10.5)
CANNABINOIDS UR QL SCN: NEGATIVE
CHLORIDE SERPL-SCNC: 106 MMOL/L (ref 95–110)
CLARITY UR: CLEAR
CO2 SERPL-SCNC: 27 MMOL/L (ref 23–29)
COLOR UR: YELLOW
CREAT SERPL-MCNC: 1.4 MG/DL (ref 0.5–1.4)
CREAT UR-MCNC: >450 MG/DL (ref 23–375)
DIFFERENTIAL METHOD BLD: ABNORMAL
EOSINOPHIL # BLD AUTO: 0.3 K/UL (ref 0–0.5)
EOSINOPHIL NFR BLD: 5.5 % (ref 0–8)
ERYTHROCYTE [DISTWIDTH] IN BLOOD BY AUTOMATED COUNT: 13.3 % (ref 11.5–14.5)
EST. GFR  (NO RACE VARIABLE): >60 ML/MIN/1.73 M^2
ETHANOL SERPL-MCNC: <10 MG/DL
GLUCOSE SERPL-MCNC: 97 MG/DL (ref 70–110)
GLUCOSE UR QL STRIP: NEGATIVE
HCT VFR BLD AUTO: 35.8 % (ref 40–54)
HGB BLD-MCNC: 12.5 G/DL (ref 14–18)
HGB UR QL STRIP: NEGATIVE
IMM GRANULOCYTES # BLD AUTO: 0.01 K/UL (ref 0–0.04)
IMM GRANULOCYTES NFR BLD AUTO: 0.2 % (ref 0–0.5)
KETONES UR QL STRIP: NEGATIVE
LEUKOCYTE ESTERASE UR QL STRIP: NEGATIVE
LYMPHOCYTES # BLD AUTO: 2.4 K/UL (ref 1–4.8)
LYMPHOCYTES NFR BLD: 39.3 % (ref 18–48)
MCH RBC QN AUTO: 32.3 PG (ref 27–31)
MCHC RBC AUTO-ENTMCNC: 34.9 G/DL (ref 32–36)
MCV RBC AUTO: 93 FL (ref 82–98)
METHADONE UR QL SCN>300 NG/ML: NEGATIVE
MONOCYTES # BLD AUTO: 0.5 K/UL (ref 0.3–1)
MONOCYTES NFR BLD: 9 % (ref 4–15)
NEUTROPHILS # BLD AUTO: 2.7 K/UL (ref 1.8–7.7)
NEUTROPHILS NFR BLD: 45 % (ref 38–73)
NITRITE UR QL STRIP: NEGATIVE
NRBC BLD-RTO: 0 /100 WBC
OPIATES UR QL SCN: NEGATIVE
PCP UR QL SCN>25 NG/ML: NEGATIVE
PH UR STRIP: 6 [PH] (ref 5–8)
PLATELET # BLD AUTO: 261 K/UL (ref 150–450)
PMV BLD AUTO: 9.8 FL (ref 9.2–12.9)
POTASSIUM SERPL-SCNC: 3.5 MMOL/L (ref 3.5–5.1)
PROT SERPL-MCNC: 6.9 G/DL (ref 6–8.4)
PROT UR QL STRIP: ABNORMAL
RBC # BLD AUTO: 3.87 M/UL (ref 4.6–6.2)
SALICYLATES SERPL-MCNC: <5 MG/DL (ref 15–30)
SODIUM SERPL-SCNC: 141 MMOL/L (ref 136–145)
SP GR UR STRIP: 1.03 (ref 1–1.03)
TOXICOLOGY INFORMATION: ABNORMAL
TSH SERPL DL<=0.005 MIU/L-ACNC: 2.07 UIU/ML (ref 0.4–4)
URN SPEC COLLECT METH UR: ABNORMAL
UROBILINOGEN UR STRIP-ACNC: NEGATIVE EU/DL
WBC # BLD AUTO: 6.03 K/UL (ref 3.9–12.7)

## 2024-04-18 PROCEDURE — 99285 EMERGENCY DEPT VISIT HI MDM: CPT

## 2024-04-18 PROCEDURE — 80179 DRUG ASSAY SALICYLATE: CPT | Performed by: SURGERY

## 2024-04-18 PROCEDURE — 81003 URINALYSIS AUTO W/O SCOPE: CPT | Mod: 59 | Performed by: SURGERY

## 2024-04-18 PROCEDURE — 80053 COMPREHEN METABOLIC PANEL: CPT | Performed by: SURGERY

## 2024-04-18 PROCEDURE — 80307 DRUG TEST PRSMV CHEM ANLYZR: CPT | Performed by: SURGERY

## 2024-04-18 PROCEDURE — 82077 ASSAY SPEC XCP UR&BREATH IA: CPT | Performed by: SURGERY

## 2024-04-18 PROCEDURE — 80143 DRUG ASSAY ACETAMINOPHEN: CPT | Performed by: SURGERY

## 2024-04-18 PROCEDURE — 85025 COMPLETE CBC W/AUTO DIFF WBC: CPT | Performed by: SURGERY

## 2024-04-18 PROCEDURE — 84443 ASSAY THYROID STIM HORMONE: CPT | Performed by: SURGERY

## 2024-04-18 RX ORDER — HALOPERIDOL 5 MG/ML
5 INJECTION INTRAMUSCULAR EVERY 4 HOURS PRN
Status: DISCONTINUED | OUTPATIENT
Start: 2024-04-18 | End: 2024-04-19 | Stop reason: HOSPADM

## 2024-04-18 RX ORDER — DIPHENHYDRAMINE HYDROCHLORIDE 50 MG/ML
50 INJECTION INTRAMUSCULAR; INTRAVENOUS EVERY 4 HOURS PRN
Status: DISCONTINUED | OUTPATIENT
Start: 2024-04-18 | End: 2024-04-19 | Stop reason: HOSPADM

## 2024-04-18 RX ORDER — LORAZEPAM 2 MG/ML
2 INJECTION INTRAMUSCULAR EVERY 4 HOURS PRN
Status: DISCONTINUED | OUTPATIENT
Start: 2024-04-18 | End: 2024-04-19 | Stop reason: HOSPADM

## 2024-04-19 VITALS
HEIGHT: 72 IN | SYSTOLIC BLOOD PRESSURE: 121 MMHG | HEART RATE: 70 BPM | RESPIRATION RATE: 16 BRPM | WEIGHT: 191.13 LBS | BODY MASS INDEX: 25.89 KG/M2 | DIASTOLIC BLOOD PRESSURE: 67 MMHG | OXYGEN SATURATION: 96 % | TEMPERATURE: 99 F

## 2024-04-19 DIAGNOSIS — F17.200 NEEDS SMOKING CESSATION EDUCATION: ICD-10-CM

## 2024-04-19 NOTE — ED TRIAGE NOTES
"43 y.o. male presents to ER ED 03 /ED 03B   Chief Complaint   Patient presents with    Psychiatric Evaluation     PT TO ER WITH C/O AUDITORY HALLUCINATIONS THAT HAVE BEEN HAPPENING FOR "AWHILE." PT DENIES SI/HI.    Pt reports auditory hallucinations telling him things like, "I'm worthless." Pt denies SI, HI, VH or drug use. Pt does report occasional alcohol use.  "

## 2024-04-19 NOTE — ED PROVIDER NOTES
"Encounter Date: 4/18/2024       History     Chief Complaint   Patient presents with    Psychiatric Evaluation     PT TO ER WITH C/O AUDITORY HALLUCINATIONS THAT HAVE BEEN HAPPENING FOR "AWHILE." PT DENIES SI/HI.      History of Present Illness  Jean Cruz is a 43 y.o. male that presents with auditory hallucinations  Patient apparently has a history of schizophrenia based on interview today  Patient has no obvious signs of suicidal or homicidal ideation on ER triage  He is psychotic, on olanzapine but it was not helping the hallucinations  Family is concerned that he has not taking medication as prescribed    The history is provided by the patient.     Review of patient's allergies indicates:  No Known Allergies  Past Medical History:   Diagnosis Date    Anxiety     Depression     History of psychiatric hospitalization     Hx of psychiatric care     Psychiatric problem     Therapy      No past surgical history on file.  No family history on file.  Social History     Tobacco Use    Smoking status: Every Day     Current packs/day: 1.00     Average packs/day: 1 pack/day for 21.0 years (21.0 ttl pk-yrs)     Types: Cigarettes    Smokeless tobacco: Never   Substance Use Topics    Alcohol use: Yes     Alcohol/week: 1.0 standard drink of alcohol     Types: 1 Cans of beer per week    Drug use: Never     Review of Systems   Constitutional:  Negative for activity change, appetite change, fatigue, fever and unexpected weight change.   HENT:  Negative for congestion, ear pain, mouth sores, nosebleeds, rhinorrhea, sinus pressure, sneezing and sore throat.    Eyes:  Negative for pain, discharge, redness and itching.   Respiratory:  Negative for apnea, cough, chest tightness and shortness of breath.    Cardiovascular:  Negative for chest pain, palpitations and leg swelling.   Gastrointestinal:  Negative for abdominal distention, abdominal pain, anal bleeding, constipation, diarrhea, nausea and vomiting.   Endocrine: Negative. "    Genitourinary:  Negative for dysuria, enuresis, flank pain and frequency.   Musculoskeletal:  Negative for arthralgias, back pain, neck pain and neck stiffness.   Skin:  Negative for color change and wound.   Allergic/Immunologic: Negative.    Neurological:  Negative for dizziness, tremors, syncope, facial asymmetry, speech difficulty, weakness, light-headedness, numbness and headaches.   Hematological:  Negative for adenopathy. Does not bruise/bleed easily.   Psychiatric/Behavioral:  Positive for hallucinations. Negative for agitation, behavioral problems, self-injury and suicidal ideas. The patient is not nervous/anxious.        Physical Exam     Initial Vitals [04/18/24 2122]   BP Pulse Resp Temp SpO2   (!) 142/93 83 20 98.5 °F (36.9 °C) 99 %      MAP       --         Physical Exam    Nursing note and vitals reviewed.  Constitutional: Vital signs are normal. He appears well-developed and well-nourished. He is cooperative.   HENT:   Head: Normocephalic and atraumatic.   Mouth/Throat: Uvula is midline and mucous membranes are normal.   Eyes: Conjunctivae, EOM and lids are normal. Pupils are equal, round, and reactive to light.   Neck: Neck supple.   Normal range of motion.   Full passive range of motion without pain.     Cardiovascular:  Normal rate, regular rhythm, S1 normal, S2 normal, normal heart sounds, intact distal pulses and normal pulses.           Pulmonary/Chest: Effort normal and breath sounds normal.   Abdominal: Abdomen is soft and flat. Bowel sounds are normal.   Musculoskeletal:         General: Normal range of motion.      Cervical back: Full passive range of motion without pain, normal range of motion and neck supple.     Neurological: He is alert and oriented to person, place, and time. He has normal strength.   Skin: Skin is warm, dry and intact. Capillary refill takes less than 2 seconds.         ED Course   Procedures  Labs Reviewed   CBC W/ AUTO DIFFERENTIAL - Abnormal; Notable for the  following components:       Result Value    RBC 3.87 (*)     Hemoglobin 12.5 (*)     Hematocrit 35.8 (*)     MCH 32.3 (*)     All other components within normal limits   URINALYSIS, REFLEX TO URINE CULTURE - Abnormal; Notable for the following components:    Protein, UA Trace (*)     All other components within normal limits    Narrative:     Specimen Source->Urine   DRUG SCREEN PANEL, URINE EMERGENCY - Abnormal; Notable for the following components:    Creatinine, Urine >450.0 (*)     All other components within normal limits    Narrative:     Specimen Source->Urine   SALICYLATE LEVEL - Abnormal; Notable for the following components:    Salicylate Lvl <5.0 (*)     All other components within normal limits   ACETAMINOPHEN LEVEL - Abnormal; Notable for the following components:    Acetaminophen (Tylenol), Serum <3.0 (*)     All other components within normal limits   TSH   COMPREHENSIVE METABOLIC PANEL   ALCOHOL,MEDICAL (ETHANOL)          Imaging Results    None          Medications   haloperidol lactate injection 5 mg (has no administration in time range)   LORazepam injection 2 mg (has no administration in time range)   diphenhydrAMINE injection 50 mg (has no administration in time range)     Medical Decision Making  43-year-old male presents with psychosis & hallucinations, history of schizophrenia  Differential includes schizophrenia, bipolar disorder, psychosis, metabolic derangement    Problems Addressed:  Acute psychosis: complicated acute illness or injury    Amount and/or Complexity of Data Reviewed  Labs: ordered. Decision-making details documented in ED Course.    ED Management & Risks of Complication, Morbidity, & Mortality:  Medically cleared for psychiatry placement: 4/18/2024  9:20 PM    Clinical Impression:  Final diagnoses:  [F23] Acute psychosis (Primary)          ED Disposition Condition    Transfer to Psych Facility Lc Ramos MD  04/19/24 0027

## 2024-04-23 PROBLEM — F20.0 PARANOID SCHIZOPHRENIA: Status: ACTIVE | Noted: 2021-01-01

## 2024-05-04 NOTE — SUBJECTIVE & OBJECTIVE
Past Medical History:   Diagnosis Date    Anxiety     Depression     History of psychiatric hospitalization      of psychiatric care     Psychiatric problem     Therapy        No past surgical history on file.    Review of patient's allergies indicates:  No Known Allergies    No current facility-administered medications on file prior to encounter.     Current Outpatient Medications on File Prior to Encounter   Medication Sig    diphenhydrAMINE (BENADRYL) 50 MG capsule Take 2 capsules (100 mg total) by mouth every evening.    esomeprazole (NEXIUM) 40 MG capsule Take 40 mg by mouth before breakfast.    mirtazapine (REMERON) 15 MG tablet Take 1 tablet (15 mg total) by mouth every evening.    risperiDONE (RISPERDAL) 3 MG Tab Take 1 tablet (3 mg total) by mouth 2 (two) times daily.    sertraline (ZOLOFT) 100 MG tablet Take 1 tablet (100 mg total) by mouth once daily.     Family History    None       Tobacco Use    Smoking status: Current Every Day Smoker     Packs/day: 1.00     Years: 21.00     Pack years: 21.00     Types: Cigarettes    Smokeless tobacco: Never Used   Substance and Sexual Activity    Alcohol use: Yes    Drug use: Never    Sexual activity: Not on file     Review of Systems   Constitutional:  Negative for chills and fever.   Respiratory:  Negative for chest tightness and shortness of breath.    Cardiovascular:  Negative for chest pain and palpitations.   Gastrointestinal:  Negative for abdominal distention, abdominal pain, blood in stool and vomiting.   Genitourinary:  Negative for dysuria, flank pain, hematuria and urgency.   Musculoskeletal:  Negative for back pain and neck pain.   Skin:  Negative for rash and wound.   Neurological:  Negative for dizziness, weakness and numbness.   Hematological:  Does not bruise/bleed easily.   Psychiatric/Behavioral:  Positive for self-injury. Negative for agitation and suicidal ideas. The patient is not nervous/anxious.    Objective:     Vital Signs (Most  Recent):  Temp: 97.5 °F (36.4 °C) (03/30/22 0719)  Pulse: 90 (03/30/22 0719)  Resp: 18 (03/30/22 0719)  BP: 124/82 (03/30/22 0719)  SpO2: 98 % (03/29/22 1940) Vital Signs (24h Range):  Temp:  [97.3 °F (36.3 °C)-97.8 °F (36.6 °C)] 97.5 °F (36.4 °C)  Pulse:  [79-90] 90  Resp:  [18-20] 18  SpO2:  [98 %-100 %] 98 %  BP: (115-170)/(66-92) 124/82     Weight: 78.6 kg (173 lb 4.5 oz)  Body mass index is 23.5 kg/m².    Physical Exam  Vitals and nursing note reviewed.   Constitutional:       Appearance: He is well-developed.   HENT:      Head: Normocephalic and atraumatic.   Neck:      Thyroid: No thyromegaly.   Cardiovascular:      Rate and Rhythm: Normal rate and regular rhythm.      Heart sounds: Normal heart sounds. No murmur heard.  Pulmonary:      Effort: Pulmonary effort is normal. No respiratory distress.      Breath sounds: Normal breath sounds. No wheezing or rales.   Abdominal:      General: Bowel sounds are normal. There is no distension.      Palpations: Abdomen is soft.      Tenderness: There is no abdominal tenderness.   Musculoskeletal:         General: No deformity. Normal range of motion.      Cervical back: Normal range of motion and neck supple.   Skin:     General: Skin is warm and dry.   Neurological:      Mental Status: He is alert and oriented to person, place, and time.      Comments: Neuro: Cranial nerves:  CN II Visual fields full to confrontation.   CN III, IV, VI Pupils are equal, round, and reactive to light.  CN III: no palsy  Nystagmus: none   Diplopia: none  Ophthalmoparesis: none  CN V Facial sensation intact.   CN VII Facial expression full, symmetric.   CN VIII normal.   CN IX normal.   CN X normal.   CN XI normal.   CN XII normal.     Psychiatric:         Behavior: Behavior normal.         Thought Content: Thought content normal.       Significant Labs:   U/A negative     UDS  Results for orders placed or performed during the hospital encounter of 03/29/22   Drug screen panel, emergency    Result Value Ref Range    Benzodiazepines Negative Negative    Methadone metabolites Negative Negative    Cocaine (Metab.) Negative Negative    Opiate Scrn, Ur Negative Negative    Barbiturate Screen, Ur Negative Negative    Amphetamine Screen, Ur Negative Negative    THC Negative Negative    Phencyclidine Negative Negative    Creatinine, Urine 207.1 23.0 - 375.0 mg/dL    Toxicology Information SEE COMMENT      CBC  Results for orders placed or performed during the hospital encounter of 03/29/22   CBC auto differential   Result Value Ref Range    WBC 7.96 3.90 - 12.70 K/uL    RBC 4.45 (L) 4.60 - 6.20 M/uL    Hemoglobin 14.2 14.0 - 18.0 g/dL    Hematocrit 42.7 40.0 - 54.0 %    MCV 96 82 - 98 fL    MCH 31.9 (H) 27.0 - 31.0 pg    MCHC 33.3 32.0 - 36.0 g/dL    RDW 13.3 11.5 - 14.5 %    Platelets 309 150 - 450 K/uL    MPV 9.9 9.2 - 12.9 fL    Immature Granulocytes 0.3 0.0 - 0.5 %    Gran # (ANC) 4.2 1.8 - 7.7 K/uL    Immature Grans (Abs) 0.02 0.00 - 0.04 K/uL    Lymph # 2.6 1.0 - 4.8 K/uL    Mono # 0.5 0.3 - 1.0 K/uL    Eos # 0.5 0.0 - 0.5 K/uL    Baso # 0.07 0.00 - 0.20 K/uL    nRBC 0 0 /100 WBC    Gran % 52.7 38.0 - 73.0 %    Lymph % 33.0 18.0 - 48.0 %    Mono % 6.7 4.0 - 15.0 %    Eosinophil % 6.4 0.0 - 8.0 %    Basophil % 0.9 0.0 - 1.9 %    Differential Method Automated      CMP  Results for orders placed or performed during the hospital encounter of 03/29/22   Comprehensive metabolic panel   Result Value Ref Range    Sodium 144 136 - 145 mmol/L    Potassium 3.5 3.5 - 5.1 mmol/L    Chloride 108 95 - 110 mmol/L    CO2 25 23 - 29 mmol/L    Glucose 114 (H) 70 - 110 mg/dL    BUN 14 6 - 20 mg/dL    Creatinine 1.1 0.5 - 1.4 mg/dL    Calcium 9.9 8.7 - 10.5 mg/dL    Total Protein 7.6 6.0 - 8.4 g/dL    Albumin 4.1 3.5 - 5.2 g/dL    Total Bilirubin 0.5 0.1 - 1.0 mg/dL    Alkaline Phosphatase 67 55 - 135 U/L     (H) 10 - 40 U/L    ALT 64 (H) 10 - 44 U/L    Anion Gap 11 8 - 16 mmol/L    eGFR if African American >60 >60  mL/min/1.73 m^2    eGFR if non African American >60 >60 mL/min/1.73 m^2     Hep screen     TSH  Results for orders placed or performed during the hospital encounter of 03/29/22   TSH   Result Value Ref Range    TSH 1.180 0.400 - 4.000 uIU/mL     ETOH  Results for orders placed or performed during the hospital encounter of 03/29/22   Ethanol   Result Value Ref Range    Alcohol, Serum 71 (H) <10 mg/dL     Lab Results   Component Value Date    HGBA1C 5.4 03/29/2022       Acetaminophen  Results for orders placed or performed during the hospital encounter of 03/29/22   Acetaminophen level   Result Value Ref Range    Acetaminophen (Tylenol), Serum <3.0 (L) 10.0 - 20.0 ug/mL            Benefits, risks, and possible complications of procedure explained to patient/caregiver who verbalized understanding and gave verbal consent.

## 2024-10-25 ENCOUNTER — PATIENT MESSAGE (OUTPATIENT)
Dept: RESEARCH | Facility: HOSPITAL | Age: 44
End: 2024-10-25
Payer: COMMERCIAL